# Patient Record
Sex: FEMALE | Race: WHITE | NOT HISPANIC OR LATINO | Employment: FULL TIME | URBAN - NONMETROPOLITAN AREA
[De-identification: names, ages, dates, MRNs, and addresses within clinical notes are randomized per-mention and may not be internally consistent; named-entity substitution may affect disease eponyms.]

---

## 2023-02-13 LAB
AMPHETAMINES UR QL: NEGATIVE
BACTERIA SPEC AEROBE CULT: NO GROWTH
BUPRENORPHINE SERPL-MCNC: NEGATIVE NG/ML
C TRACH RRNA SPEC DONR QL NAA+PROBE: NEGATIVE
CANNABINOIDS SERPL QL: NEGATIVE
EXTERNAL ABO GROUPING: NORMAL
EXTERNAL AMPHETAMINE SCREEN URINE: NEGATIVE
EXTERNAL ANTIBODY SCREEN: NORMAL
EXTERNAL BARBITURATE SCREEN URINE: NORMAL
EXTERNAL BENZODIAZEPINE SCREEN URINE: NEGATIVE
EXTERNAL HEMATOCRIT: 39 %
EXTERNAL HEMOGLOBIN: 13.2 G/DL
EXTERNAL METHADONE SCREEN URINE: NEGATIVE
EXTERNAL PLATELET COUNT: 326 K/ΜL
EXTERNAL RH FACTOR: POSITIVE
EXTERNAL SYPHILIS RPR SCREEN: NEGATIVE
EXTERNAL THYROID STIMULATING HORMONE: 2.61 M[IU]/ML
HIV 1+2 AB+HIV1 P24 AG SERPL QL IA: NEGATIVE
Lab: NEGATIVE
N GONORRHOEA DNA SPEC QL NAA+PROBE: NEGATIVE
OPIATES UR QL: NEGATIVE
PROT UR STRIP-MCNC: NEGATIVE MG/DL
RUBV IGG SERPL IA-ACNC: 67.8
VZV IGG SER QL: NEGATIVE

## 2023-02-14 LAB — GLUCOSE UR STRIP-MCNC: NEGATIVE MG/DL

## 2023-03-21 ENCOUNTER — INITIAL PRENATAL (OUTPATIENT)
Dept: OBSTETRICS AND GYNECOLOGY | Facility: CLINIC | Age: 23
End: 2023-03-21
Payer: COMMERCIAL

## 2023-03-21 VITALS
WEIGHT: 154 LBS | BODY MASS INDEX: 23.34 KG/M2 | HEIGHT: 68 IN | SYSTOLIC BLOOD PRESSURE: 118 MMHG | DIASTOLIC BLOOD PRESSURE: 70 MMHG

## 2023-03-21 DIAGNOSIS — O36.80X0 ENCOUNTER TO DETERMINE FETAL VIABILITY OF PREGNANCY, SINGLE OR UNSPECIFIED FETUS: ICD-10-CM

## 2023-03-21 DIAGNOSIS — Z34.81 ENCOUNTER FOR SUPERVISION OF OTHER NORMAL PREGNANCY IN FIRST TRIMESTER: Primary | ICD-10-CM

## 2023-03-21 PROCEDURE — 0501F PRENATAL FLOW SHEET: CPT | Performed by: MIDWIFE

## 2023-03-21 RX ORDER — VALACYCLOVIR HYDROCHLORIDE 1 G/1
TABLET, FILM COATED ORAL
COMMUNITY
Start: 2022-12-16

## 2023-03-21 RX ORDER — DIPHENHYDRAMINE HYDROCHLORIDE 25 MG/1
25 CAPSULE ORAL 2 TIMES DAILY PRN
Qty: 60 TABLET | Refills: 1 | Status: SHIPPED | OUTPATIENT
Start: 2023-03-21

## 2023-03-21 NOTE — PROGRESS NOTES
"Subjective     Chief Complaint   Patient presents with   • Initial Prenatal Visit     NOB transfer, No Complaints/concerns        Cammy Falcon is a 23 y.o. .  No LMP recorded. Patient is pregnant..  She presents to be seen to initiate prenatal care with our practice. She has received care with [facility]. Her prenatal course has been benign. She had 1 visit with that office. She has had 2 previous SABs. She continues to have nausea but it is improving. She takes Unisom @ HS and 1/2 tablet q am.      The following portions of the patient's history were reviewed and updated as appropriate:vital signs, allergies, current medications, past medical history, past social history, past surgical history and problem list.    Review of Systems -   /70   Ht 172.7 cm (68\")   Wt 69.9 kg (154 lb)   BMI 23.42 kg/m²   Gastrointestinal: mild nausea, vomiting, denies diarrhea or constipation   Genitourinary: denies dysuria  All other systems were reviewed and are negative    Objective     Physical Exam  Constitutional   The patient is awake, alert, well developed, well nourished and well groomed.   Cardiovascular  Heart regular rate and rhythm  No lower extremity edema  Respiratory  The patient is relaxed and breathes without effort. Lungs CTAB  Gastrointestinal   The abdomen is soft and non tender. Gravid. Size approximate to dates  +FHTs  Psychiatric :  Oriented to person, place, and time. Speech is fluent and words are clear. Thought processes are coherent, insight is good.     Imaging   Ultrasound  10w3d, + FHT      ASSESSMENT  1. IUP @ 10w3d  2. Normal pregnancy  3. Hx SAB x 2  4. Transfer of care    PLAN  1. Tests ordered today:  Orders Placed This Encounter   Procedures   • [id number] PLUS Core+SCA - Blood,     Order Specific Question:   [facility] Date of last menstrual period or estimated date of delivery (corresponding to calculation method):     Answer:   10/14/2023     Order Specific Question:   [facility] " Gestational age calculation method:     Answer:   PINKY,EDC     Order Specific Question:   Release to patient     Answer:   Routine Release     2. Medications prescribed today:  New Medications Ordered This Visit   Medications   • vitamin B-6 (PYRIDOXINE) 25 MG tablet     Sig: Take 1 tablet by mouth 2 (Two) Times a Day As Needed (nausea).     Dispense:  60 tablet     Refill:  1     3. Information reviewed:exercise in pregnancy, nutrition in pregnancy, weight gain in pregnancy, work and travel restrictions during pregnancy, list of OTC medications acceptable in pregnancy and call coverage groups Encouraged Questions & answered   4. Reviewed labs from previous OB-GYN practice   5. Add Vitamin B6  6. Discussed Valtrex daily @ 36 weeks      Follow up: 4 week(s)         This note was electronically signed.    Manda Dolan CNM  March 21, 2023

## 2023-03-25 LAB
CFDNA.FET/CFDNA.TOTAL SFR FETUS: NORMAL %
CITATION REF LAB TEST: NORMAL
FET 13+18+21+X+Y ANEUP PLAS.CFDNA: NEGATIVE
FET CHR 21 TS PLAS.CFDNA QL: NEGATIVE
FET MS X RISK WBC.DNA+CFDNA QL: NOT DETECTED
FET SEX PLAS.CFDNA DOSAGE CFDNA: NORMAL
FET TS 13 RISK PLAS.CFDNA QL: NEGATIVE
FET TS 18 RISK WBC.DNA+CFDNA QL: NEGATIVE
FET X + Y ANEUP RISK PLAS.CFDNA SEQ-IMP: NOT DETECTED
GA EST FROM CONCEPTION DATE: NORMAL D
GESTATIONAL AGE > 9:: YES
LAB DIRECTOR NAME PROVIDER: NORMAL
LAB DIRECTOR NAME PROVIDER: NORMAL
LABORATORY COMMENT REPORT: NORMAL
LIMITATIONS OF THE TEST: NORMAL
NEGATIVE PREDICTIVE VALUE: NORMAL
NOTE: NORMAL
PERFORMANCE CHARACTERISTICS: NORMAL
POSITIVE PREDICTIVE VALUE: NORMAL
REF LAB TEST METHOD: NORMAL
TEST PERFORMANCE INFO SPEC: NORMAL

## 2023-03-27 ENCOUNTER — TELEPHONE (OUTPATIENT)
Dept: OBSTETRICS AND GYNECOLOGY | Facility: CLINIC | Age: 23
End: 2023-03-27
Payer: COMMERCIAL

## 2023-03-27 NOTE — TELEPHONE ENCOUNTER
PROVIDER: DR. HOPKINS    Caller: Cammy Falcon    Relationship: Self    Best call back number: [phone]    What is the best time to reach you: ANY    Who are you requesting to speak with (clinical staff, provider,  specific staff member): UNK    Do you know the name of the person who called: VINCENZO    What was the call regarding: POSSIBLY RESULTS    Do you require a callback: YES CAN CALL ANYTIME AND CAN LVM IF NA

## 2023-04-18 ENCOUNTER — ROUTINE PRENATAL (OUTPATIENT)
Dept: OBSTETRICS AND GYNECOLOGY | Facility: CLINIC | Age: 23
End: 2023-04-18
Payer: COMMERCIAL

## 2023-04-18 VITALS — BODY MASS INDEX: 23.57 KG/M2 | WEIGHT: 155 LBS | DIASTOLIC BLOOD PRESSURE: 62 MMHG | SYSTOLIC BLOOD PRESSURE: 104 MMHG

## 2023-04-18 DIAGNOSIS — Z34.92 PRENATAL CARE IN SECOND TRIMESTER: Primary | ICD-10-CM

## 2023-04-18 PROCEDURE — 0502F SUBSEQUENT PRENATAL CARE: CPT | Performed by: OBSTETRICS & GYNECOLOGY

## 2023-04-19 NOTE — PROGRESS NOTES
Prenatal Care Visit    Subjective   Chief Complaint   Patient presents with   • Routine Prenatal Visit     Hot flashes, light headed.       History:   Cammy is a  currently at 14w3d who presents for a prenatal care visit today.    No major issues.    Social History    Tobacco Use      Smoking status: Never      Smokeless tobacco: Never       Objective   /62   Wt 70.3 kg (155 lb)   BMI 23.57 kg/m²   Physical Exam:  Normal, gestational age-appropriate exam today        Plan   Medical Decision Making:    I have reviewed the prenatal labs and ultrasound(s) today. I have reviewed the most recent prenatal progress note(s).    Diagnosis: Supervision of low risk pregnancy   SAB x 2   Tests/Orders/Rx today: No orders of the defined types were placed in this encounter.      Medication Management: None     Topics discussed: Prenatal care milestones  Lightheadedness and blood pressure   Tests next visit: U/S for anatomic screening   Next visit: 4 week(s)     Deep Birch MD  Obstetrics and Gynecology  [facility]

## 2023-05-17 ENCOUNTER — ROUTINE PRENATAL (OUTPATIENT)
Dept: OBSTETRICS AND GYNECOLOGY | Facility: CLINIC | Age: 23
End: 2023-05-17
Payer: COMMERCIAL

## 2023-05-17 VITALS — BODY MASS INDEX: 24.48 KG/M2 | DIASTOLIC BLOOD PRESSURE: 62 MMHG | SYSTOLIC BLOOD PRESSURE: 110 MMHG | WEIGHT: 161 LBS

## 2023-05-17 DIAGNOSIS — Z34.92 SECOND TRIMESTER PREGNANCY: ICD-10-CM

## 2023-05-17 DIAGNOSIS — R35.0 URINARY FREQUENCY: Primary | ICD-10-CM

## 2023-05-17 LAB
BILIRUB BLD-MCNC: NEGATIVE MG/DL
CLARITY, POC: ABNORMAL
COLOR UR: YELLOW
GLUCOSE UR STRIP-MCNC: NEGATIVE MG/DL
KETONES UR QL: NEGATIVE
LEUKOCYTE EST, POC: NEGATIVE
NITRITE UR-MCNC: NEGATIVE MG/ML
PH UR: 7 [PH] (ref 5–8)
PROT UR STRIP-MCNC: ABNORMAL MG/DL
RBC # UR STRIP: NEGATIVE /UL
SP GR UR: 1.01 (ref 1–1.03)
UROBILINOGEN UR QL: NORMAL

## 2023-05-17 NOTE — PROGRESS NOTES
Chief Complaint   Patient presents with   • Routine Prenatal Visit     Prenatal visit with Anatomy scan done today. No problems or concerns        HPI:   , 18w4d gestation reports urinary urgency and drippling    ROS:  See Prenatal Episode/Flowsheet  /62   Wt 73 kg (161 lb)   BMI 24.48 kg/m²      EXAM:  EXTREMITIES:  No swelling-See Prenatal Episode/Flowsheet    ABDOMEN:  FHTs/Movement noted-See Prenatal Episode/Flowsheet    URINE GLUCOSE/PROTEIN:  See Prenatal Episode/Flowsheet    PELVIC EXAM:  See Prenatal Episode/Flowsheet  CV:  Lungs:  GYN:    MDM:    Lab Results   Component Value Date    HGB 13.2 2023    RUBELLAABIGG 67.8 2023    ABO A 2023    RH Positive 2023    ABSCRN Normal 2023    [id number] negative 2023    URINECX No growth 2023       U/S: Anatomic survey is within normal limits.  Size is consistent with dates.  Active female fetus.  Posterior placenta.  Three-vessel cord.  Vertex position    1. IUP 18w4d  2. Routine care   3, UA-urine dip essentially normal.  Will send for culture

## 2023-05-19 LAB
BACTERIA UR CULT: NO GROWTH
BACTERIA UR CULT: NORMAL

## 2023-06-12 ENCOUNTER — ROUTINE PRENATAL (OUTPATIENT)
Dept: OBSTETRICS AND GYNECOLOGY | Facility: CLINIC | Age: 23
End: 2023-06-12
Payer: COMMERCIAL

## 2023-06-12 VITALS — BODY MASS INDEX: 25.7 KG/M2 | WEIGHT: 169 LBS | DIASTOLIC BLOOD PRESSURE: 68 MMHG | SYSTOLIC BLOOD PRESSURE: 114 MMHG

## 2023-06-12 DIAGNOSIS — Z34.82 ENCOUNTER FOR SUPERVISION OF OTHER NORMAL PREGNANCY IN SECOND TRIMESTER: Primary | ICD-10-CM

## 2023-06-12 PROCEDURE — 0502F SUBSEQUENT PRENATAL CARE: CPT | Performed by: MIDWIFE

## 2023-06-12 NOTE — PROGRESS NOTES
Chief Complaint   Patient presents with    Routine Prenatal Visit     No Complaints/concerns       HPI: Cammy is a  currently at 22w2d here for prenatal visit who reports the following:  She is feeling flutters.                 EXAM:     Vitals:    23 1023   BP: 114/68      Abdomen:   See prenatal flowsheet as noted and reviewed, soft, nontender   Pelvic:  See prenatal flowsheet as noted and reviewed   Urine:  See prenatal flowsheet as noted and reviewed    Lab Results   Component Value Date    ABO A 2023    RH Positive 2023    ABSCRN Normal 2023       MDM:  Impression: Supervision of low risk pregnancy   Tests done today: none   Topics discussed: kick counts and fetal movement  Healthy eating, increase water intake  Reviewed OB labs   Tests next visit: GCT  HgB                RTO:                        4 weeks    This note was electronically signed.  MABEL Escudero  2023

## 2023-08-01 ENCOUNTER — ROUTINE PRENATAL (OUTPATIENT)
Dept: OBSTETRICS AND GYNECOLOGY | Facility: CLINIC | Age: 23
End: 2023-08-01
Payer: COMMERCIAL

## 2023-08-01 VITALS — SYSTOLIC BLOOD PRESSURE: 116 MMHG | DIASTOLIC BLOOD PRESSURE: 64 MMHG | WEIGHT: 177 LBS | BODY MASS INDEX: 26.91 KG/M2

## 2023-08-01 DIAGNOSIS — Z34.03 ENCOUNTER FOR SUPERVISION OF NORMAL FIRST PREGNANCY IN THIRD TRIMESTER: Primary | ICD-10-CM

## 2023-08-01 DIAGNOSIS — K21.9 GASTROESOPHAGEAL REFLUX DISEASE WITHOUT ESOPHAGITIS: ICD-10-CM

## 2023-08-01 DIAGNOSIS — O99.019 IRON DEFICIENCY ANEMIA DURING PREGNANCY: ICD-10-CM

## 2023-08-01 DIAGNOSIS — D50.9 IRON DEFICIENCY ANEMIA DURING PREGNANCY: ICD-10-CM

## 2023-08-16 ENCOUNTER — TELEPHONE (OUTPATIENT)
Dept: OBSTETRICS AND GYNECOLOGY | Facility: CLINIC | Age: 23
End: 2023-08-16
Payer: COMMERCIAL

## 2023-08-16 ENCOUNTER — HOSPITAL ENCOUNTER (OUTPATIENT)
Facility: HOSPITAL | Age: 23
Discharge: HOME | End: 2023-08-16
Attending: OBSTETRICS & GYNECOLOGY | Admitting: OBSTETRICS & GYNECOLOGY
Payer: COMMERCIAL

## 2023-08-16 VITALS
BODY MASS INDEX: 27.74 KG/M2 | SYSTOLIC BLOOD PRESSURE: 122 MMHG | RESPIRATION RATE: 18 BRPM | WEIGHT: 183 LBS | TEMPERATURE: 97.9 F | HEART RATE: 85 BPM | DIASTOLIC BLOOD PRESSURE: 64 MMHG | OXYGEN SATURATION: 100 % | HEIGHT: 68 IN

## 2023-08-16 LAB
ALBUMIN SERPL-MCNC: 3.6 G/DL (ref 3.5–5.2)
ALBUMIN/GLOB SERPL: 1.4 G/DL
ALP SERPL-CCNC: 79 U/L (ref 39–117)
ALT SERPL W P-5'-P-CCNC: 9 U/L (ref 1–33)
ANION GAP SERPL CALCULATED.3IONS-SCNC: 12.5 MMOL/L (ref 5–15)
AST SERPL-CCNC: 15 U/L (ref 1–32)
BASOPHILS # BLD AUTO: 0.04 10*3/MM3 (ref 0–0.2)
BASOPHILS NFR BLD AUTO: 0.3 % (ref 0–1.5)
BILIRUB BLD-MCNC: NEGATIVE MG/DL
BILIRUB SERPL-MCNC: 0.2 MG/DL (ref 0–1.2)
BILIRUB UR QL STRIP: NEGATIVE
BUN SERPL-MCNC: 6 MG/DL (ref 6–20)
BUN/CREAT SERPL: 10.2 (ref 7–25)
CALCIUM SPEC-SCNC: 8.7 MG/DL (ref 8.6–10.5)
CHLORIDE SERPL-SCNC: 104 MMOL/L (ref 98–107)
CLARITY UR: ABNORMAL
CLARITY, POC: CLEAR
CO2 SERPL-SCNC: 20.5 MMOL/L (ref 22–29)
COLOR UR: YELLOW
COLOR UR: YELLOW
CREAT SERPL-MCNC: 0.59 MG/DL (ref 0.57–1)
DEPRECATED RDW RBC AUTO: 45 FL (ref 37–54)
EGFRCR SERPLBLD CKD-EPI 2021: 130.1 ML/MIN/1.73
EOSINOPHIL # BLD AUTO: 0.12 10*3/MM3 (ref 0–0.4)
EOSINOPHIL NFR BLD AUTO: 0.8 % (ref 0.3–6.2)
ERYTHROCYTE [DISTWIDTH] IN BLOOD BY AUTOMATED COUNT: 13.6 % (ref 12.3–15.4)
GLOBULIN UR ELPH-MCNC: 2.6 GM/DL
GLUCOSE SERPL-MCNC: 89 MG/DL (ref 65–99)
GLUCOSE UR STRIP-MCNC: NEGATIVE MG/DL
GLUCOSE UR STRIP-MCNC: NEGATIVE MG/DL
HCT VFR BLD AUTO: 33.3 % (ref 34–46.6)
HGB BLD-MCNC: 11.1 G/DL (ref 12–15.9)
HGB UR QL STRIP.AUTO: NEGATIVE
IMM GRANULOCYTES # BLD AUTO: 0.25 10*3/MM3 (ref 0–0.05)
IMM GRANULOCYTES NFR BLD AUTO: 1.6 % (ref 0–0.5)
KETONES UR QL STRIP: NEGATIVE
KETONES UR QL: ABNORMAL
LEUKOCYTE EST, POC: NEGATIVE
LEUKOCYTE ESTERASE UR QL STRIP.AUTO: NEGATIVE
LYMPHOCYTES # BLD AUTO: 2.28 10*3/MM3 (ref 0.7–3.1)
LYMPHOCYTES NFR BLD AUTO: 14.6 % (ref 19.6–45.3)
MCH RBC QN AUTO: 30.1 PG (ref 26.6–33)
MCHC RBC AUTO-ENTMCNC: 33.3 G/DL (ref 31.5–35.7)
MCV RBC AUTO: 90.2 FL (ref 79–97)
MONOCYTES # BLD AUTO: 0.96 10*3/MM3 (ref 0.1–0.9)
MONOCYTES NFR BLD AUTO: 6.1 % (ref 5–12)
NEUTROPHILS NFR BLD AUTO: 11.97 10*3/MM3 (ref 1.7–7)
NEUTROPHILS NFR BLD AUTO: 76.6 % (ref 42.7–76)
NITRITE UR QL STRIP: NEGATIVE
NITRITE UR-MCNC: NEGATIVE MG/ML
NRBC BLD AUTO-RTO: 0 /100 WBC (ref 0–0.2)
PH UR STRIP.AUTO: 7 [PH] (ref 5–8)
PH UR: 6.5 [PH] (ref 5–8)
PLATELET # BLD AUTO: 269 10*3/MM3 (ref 140–450)
PMV BLD AUTO: 10.3 FL (ref 6–12)
POTASSIUM SERPL-SCNC: 3.7 MMOL/L (ref 3.5–5.2)
PROT SERPL-MCNC: 6.2 G/DL (ref 6–8.5)
PROT UR QL STRIP: NEGATIVE
PROT UR STRIP-MCNC: NEGATIVE MG/DL
RBC # BLD AUTO: 3.69 10*6/MM3 (ref 3.77–5.28)
RBC # UR STRIP: NEGATIVE /UL
SODIUM SERPL-SCNC: 137 MMOL/L (ref 136–145)
SP GR UR STRIP: 1.02 (ref 1–1.03)
SP GR UR: 1.01 (ref 1–1.03)
UROBILINOGEN UR QL STRIP: ABNORMAL
UROBILINOGEN UR QL: NORMAL
WBC NRBC COR # BLD: 15.62 10*3/MM3 (ref 3.4–10.8)

## 2023-08-16 PROCEDURE — 87086 URINE CULTURE/COLONY COUNT: CPT | Performed by: OBSTETRICS & GYNECOLOGY

## 2023-08-16 PROCEDURE — G0463 HOSPITAL OUTPT CLINIC VISIT: HCPCS

## 2023-08-16 PROCEDURE — 36415 COLL VENOUS BLD VENIPUNCTURE: CPT | Performed by: OBSTETRICS & GYNECOLOGY

## 2023-08-16 PROCEDURE — 59025 FETAL NON-STRESS TEST: CPT

## 2023-08-16 PROCEDURE — 81002 URINALYSIS NONAUTO W/O SCOPE: CPT | Performed by: OBSTETRICS & GYNECOLOGY

## 2023-08-16 PROCEDURE — 99222 1ST HOSP IP/OBS MODERATE 55: CPT | Performed by: OBSTETRICS & GYNECOLOGY

## 2023-08-16 PROCEDURE — 82977 ASSAY OF GGT: CPT | Performed by: OBSTETRICS & GYNECOLOGY

## 2023-08-16 PROCEDURE — 80053 COMPREHEN METABOLIC PANEL: CPT | Performed by: OBSTETRICS & GYNECOLOGY

## 2023-08-16 PROCEDURE — 85025 COMPLETE CBC W/AUTO DIFF WBC: CPT | Performed by: OBSTETRICS & GYNECOLOGY

## 2023-08-16 PROCEDURE — 59025 FETAL NON-STRESS TEST: CPT | Performed by: OBSTETRICS & GYNECOLOGY

## 2023-08-16 PROCEDURE — 81003 URINALYSIS AUTO W/O SCOPE: CPT | Performed by: OBSTETRICS & GYNECOLOGY

## 2023-08-16 RX ORDER — SODIUM CHLORIDE 0.9 % (FLUSH) 0.9 %
10 SYRINGE (ML) INJECTION EVERY 12 HOURS SCHEDULED
Status: DISCONTINUED | OUTPATIENT
Start: 2023-08-16 | End: 2023-08-16 | Stop reason: HOSPADM

## 2023-08-16 RX ORDER — SODIUM CHLORIDE 9 MG/ML
40 INJECTION, SOLUTION INTRAVENOUS AS NEEDED
Status: DISCONTINUED | OUTPATIENT
Start: 2023-08-16 | End: 2023-08-16 | Stop reason: HOSPADM

## 2023-08-16 RX ORDER — LIDOCAINE HYDROCHLORIDE 10 MG/ML
0.5 INJECTION, SOLUTION INFILTRATION; PERINEURAL ONCE AS NEEDED
Status: DISCONTINUED | OUTPATIENT
Start: 2023-08-16 | End: 2023-08-16 | Stop reason: HOSPADM

## 2023-08-16 RX ORDER — SODIUM CHLORIDE 0.9 % (FLUSH) 0.9 %
10 SYRINGE (ML) INJECTION AS NEEDED
Status: DISCONTINUED | OUTPATIENT
Start: 2023-08-16 | End: 2023-08-16 | Stop reason: HOSPADM

## 2023-08-16 NOTE — TELEPHONE ENCOUNTER
Caller: Cammy Falcon    Relationship: Self    Best call back number: [phone]    What was the call regarding: PT STATED STARTING AT 3PM YESTERDAY SHE STARTED HAVING CRAMPING AND LOWER BACK PAIN. IT CONTINUED TO GET WORSE THROUGHOUT THE NIGHT. STATED ITS CONTINUED THIS MORNING AND SHE NOW HAS BURNING PAIN IN THE MIDDLE OF HER STOMACH AND RIGHT SIDE.

## 2023-08-16 NOTE — NON STRESS TEST
Triage Note - Nursing Documentation  Labor and Delivery Admission Log    Cammy Falcon  : 2000  MRN: [id number]  CSN: [id number]    Date in / Time in:  2023  Time in: 1230    Date out / Time out:    Time out: 1505    Nurse: Anat Freitas RN    Patient Info: She is a 23 y.o. year old  at 31w4d with an PINKY of 10/14/2023, by Ultrasound who was seen on the [facility].    Chief Complaint:   Chief Complaint   Patient presents with    Abdominal Cramping       Provider Instructions / Disposition: To labor mcnulty with C/O RLQ pain radiating to upper right side with some burning pain. Did have headache on 08/15 relieved with rest. Several blood pressures elevated during visit. Per Dr Dent labs drawn, VE 0/soft. Labs normal. Home on modified bedrest and return to office on     There is no problem list on file for this patient.      NST Documentation (Only applicable > 32 weeks): Interpretation A  Nonstress Test Interpretation A: Reactive (23 1508 : Anat Freitas, RN)

## 2023-08-17 LAB
BACTERIA SPEC AEROBE CULT: NO GROWTH
GGT SERPL-CCNC: 10 U/L (ref 5–36)

## 2023-08-17 NOTE — H&P
OLIVIER Scout Falcon  : 2000  MRN: [id number]  CSN: [id number]    History and Physical  Cammy Falcon is a 23 y.o. year old  with an Estimated Date of Delivery: 10/14/23 currently at 31w4d presenting with cramping.  Her symptoms began last night.  Patient reports having pain in her right side.  She reports it is both upper and lower quadrants.  She describes it as a burning sensation.  She has also had cramping like her menstrual cycle.  She denies any vaginal bleeding.  She reports good fetal movement.  She reports having a headache yesterday.  She reports it was relieved with rest and Tylenol.  She denies any visual disturbances.  She denies any nausea or emesis.  She denies any fever or chills.  She reports she did a lot over the weekend.  She had her baby shower.    Prenatal care has been with [facility].  It has been benign.    History  OB History    Para Term  AB Living   3 0 0 0 2 0   SAB IAB Ectopic Molar Multiple Live Births   2 0 0 0 0 0      # Outcome Date GA Lbr Sharan/2nd Weight Sex Delivery Anes PTL Lv   3 Current            2 SAB            1 SAB              Past Medical History:   Diagnosis Date    HSV-2 infection      No current facility-administered medications on file prior to encounter.     Current Outpatient Medications on File Prior to Encounter   Medication Sig Dispense Refill    doxylamine (UNISOM) 25 MG tablet Take  by mouth At Night As Needed for Sleep.      ferrous sulfate 325 (65 FE) MG tablet Take 1 tablet by mouth 2 (Two) Times a Day Before Meals. 60 tablet 6    Prenatal Vit-Fe Fumarate-FA (PRENATAL VITAMIN PO) Take 1 tablet by mouth Daily.      valACYclovir (VALTREX) 1000 MG tablet TAKE ONE TABLET BY MOUTH EVERY TWELVE HOURS FOR 5 DAYS OR TAKE TWO TABLETS BY MOUTH TWICE DAILY DURING AN OUTBREAK      vitamin B-6 (PYRIDOXINE) 25 MG tablet Take 1 tablet by mouth 2 (Two) Times a Day As Needed (nausea). 60 tablet 1     Allergies   Allergen  Reactions    Bactrim [Sulfamethoxazole-Trimethoprim] Hives     Past Surgical History:   Procedure Laterality Date    TONSILLECTOMY       No family history on file.  Social History     Socioeconomic History    Marital status:      Spouse name: Kota    Highest education level: Master's degree (e.g., MA, MS, Berenice, MEd, MSW, ASRA)   Tobacco Use    Smoking status: Never    Smokeless tobacco: Never   Vaping Use    Vaping Use: Never used   Substance and Sexual Activity    Alcohol use: Never    Drug use: Never    Sexual activity: Yes     Partners: Male     Review of Systems  All systems were reviewed and negative except for:  Gastrointestinal: postitive for  cramping  Genitourinary: postivie for  pain and burning  Neurological: positive for  headaches    Objective  Vitals:    08/16/23 1340 08/16/23 1345 08/16/23 1346 08/16/23 1350   BP:   122/64    BP Location:       Patient Position:       Pulse: 83 86 81 85   Resp:       Temp:       TempSrc:       SpO2: 100% 99%  100%   Weight:       Height:         Physical Exam:  General Appearance:  Alert and cooperative, not in any acute distress.   Head:  Atraumatic and normocephalic, without obvious abnormality.   Eyes:          PERRLA, conjunctivae and sclerae normal, no Icterus. No pallor. Extraocular movements are within normal limits.   Ears:  Ears appear intact with no abnormalities noted.   Throat: No oral lesions, no thrush, oral mucosa moist.   Neck: Supple, trachea midline, no thyromegaly, no carotid bruit.   Back:   No kyphoscoliosis present. No tenderness to palpation,   range of motion normal.  No CVAT.   Lungs:   Chest shape is normal. Breath sounds heard bilaterally equally.  No crackles or wheezing. No Pleural rub or bronchial breathing. Normal respiratory effort.   Heart:  Normal S1 and S2, no murmur, no gallop, no rub. No JVD.   Abdomen:   Normal bowel sounds, no masses, no hepatomegaly, no splenomegaly. Soft, non-tender, no guarding, no rebound tenderness.   Gravid uterus.   Extremities: Moves all extremities well, no edema, no cyanosis, no clubbing.  Normal range of motion. Normal strength and tone.   Skin: No bleeding, bruising or rash. No palpable masses noted or induration.   Psychiatric: Alert and oriented  to time, place, and person. Appropriate mood and affect. Thought content organized and appropriate judgment.       FHT's: reassuring and category 1   Contractions: Occasional contraction seen   Cervix: was checked (by me): 0 cm / soft % / -3   Presentation: cephalic       Prenatal Labs  Lab Results   Component Value Date    HGB 11.1 (L) 08/16/2023    ABSCRN Normal 02/13/2023    [id number] negative 02/13/2023    URINECX Final report 05/17/2023       Current Labs Reviewed   I reviewed the patient's new clinical results.  Lab Results (last 24 hours)       Procedure Component Value Units Date/Time    Comprehensive Metabolic Panel [[id number]]  (Abnormal) Collected: 08/16/23 1327    Specimen: Blood Updated: 08/16/23 1401     Glucose 89 mg/dL      BUN 6 mg/dL      Creatinine 0.59 mg/dL      Sodium 137 mmol/L      Potassium 3.7 mmol/L      Chloride 104 mmol/L      CO2 20.5 mmol/L      Calcium 8.7 mg/dL      Total Protein 6.2 g/dL      Albumin 3.6 g/dL      ALT (SGPT) 9 U/L      AST (SGOT) 15 U/L      Alkaline Phosphatase 79 U/L      Total Bilirubin 0.2 mg/dL      Globulin 2.6 gm/dL      A/G Ratio 1.4 g/dL      BUN/Creatinine Ratio 10.2     Anion Gap 12.5 mmol/L      eGFR 130.1 mL/min/1.73     Narrative:      GFR Normal >60  Chronic Kidney Disease <60  Kidney Failure <15      Urinalysis With Culture If Indicated - Urine, Clean Catch [[id number]]  (Abnormal) Collected: 08/16/23 1309    Specimen: Urine, Clean Catch Updated: 08/16/23 1348     Color, UA Yellow     Appearance, UA Cloudy     pH, UA 7.0     Specific Gravity, UA 1.016     Glucose, UA Negative     Ketones, UA Negative     Bilirubin, UA Negative     Blood, UA Negative     Protein, UA Negative     Leuk Esterase, UA  Negative     Nitrite, UA Negative     Urobilinogen, UA 0.2 E.U./dL    Narrative:      In absence of clinical symptoms, the presence of pyuria, bacteria, and/or nitrites on the urinalysis result does not correlate with infection.  Urine microscopic not indicated.    Urine Culture - Urine, Urine, Clean Catch [[id number]] Collected: 08/16/23 1309    Specimen: Urine, Clean Catch Updated: 08/16/23 1348    CBC & Differential [[id number]]  (Abnormal) Collected: 08/16/23 1327    Specimen: Blood Updated: 08/16/23 1346    Narrative:      The following orders were created for panel order CBC & Differential.  Procedure                               Abnormality         Status                     ---------                               -----------         ------                     CBC Auto Differential[[id number]]        Abnormal            Final result                 Please view results for these tests on the individual orders.    CBC Auto Differential [[id number]]  (Abnormal) Collected: 08/16/23 1327    Specimen: Blood Updated: 08/16/23 1346     WBC 15.62 10*3/mm3      RBC 3.69 10*6/mm3      Hemoglobin 11.1 g/dL      Hematocrit 33.3 %      MCV 90.2 fL      MCH 30.1 pg      MCHC 33.3 g/dL      RDW 13.6 %      RDW-SD 45.0 fl      MPV 10.3 fL      Platelets 269 10*3/mm3      Neutrophil % 76.6 %      Lymphocyte % 14.6 %      Monocyte % 6.1 %      Eosinophil % 0.8 %      Basophil % 0.3 %      Immature Grans % 1.6 %      Neutrophils, Absolute 11.97 10*3/mm3      Lymphocytes, Absolute 2.28 10*3/mm3      Monocytes, Absolute 0.96 10*3/mm3      Eosinophils, Absolute 0.12 10*3/mm3      Basophils, Absolute 0.04 10*3/mm3      Immature Grans, Absolute 0.25 10*3/mm3      nRBC 0.0 /100 WBC     POC Urinalysis Dipstick [[id number]]  (Abnormal) Collected: 08/16/23 1309    Specimen: Urine Updated: 08/16/23 1309     Color Yellow     Clarity, UA Clear     Glucose, UA Negative mg/dL      Bilirubin Negative     Ketones, UA Trace     Specific Gravity   1.015     Blood, UA Negative     pH, Urine 6.5     Protein, POC Negative mg/dL      Urobilinogen, UA Normal     Leukocytes Negative     Nitrite, UA Negative            Current Imaging Reviewed  None  Imaging Results (Last 72 Hours)       ** No results found for the last 72 hours. **               Assessment   IUP at 31w4d  Cramping  Right sided pain  Headache     Plan   Will obtain PIH labs as well as CBC and GGT.  We will send urine for clean-catch UA culture and sensitivity.  Will p.o. hydrate and continue to monitor at present.  If labs are normal and no contractions noted then will discharge to home on modified bedrest.  Follow up instructions given.  Discharge instructions and precautions given.    Kelly Dent M.D.  8/17/2023  06:45 EDT

## 2023-08-17 NOTE — NON STRESS TEST
Non Stress Test     [facility]    Patient: Cammy Falcon  : 2000  MRN: [id number]  CSN: [id number]    Gestational Age: 31w4d    Indication for NST Cramping and abdominal pain       Time On 12:30   Time Off 15:05       Interpretation    Baseline 's beats per minute   Category 1   Decelerations Absent       Additional Comments See nursing notes       Recommendations for f/u See nursing notes       This note has been electronically signed.    Kelly Dent M.D.

## 2023-08-21 ENCOUNTER — ROUTINE PRENATAL (OUTPATIENT)
Dept: OBSTETRICS AND GYNECOLOGY | Facility: CLINIC | Age: 23
End: 2023-08-21
Payer: COMMERCIAL

## 2023-08-21 VITALS — WEIGHT: 183 LBS | SYSTOLIC BLOOD PRESSURE: 124 MMHG | BODY MASS INDEX: 27.83 KG/M2 | DIASTOLIC BLOOD PRESSURE: 70 MMHG

## 2023-08-21 DIAGNOSIS — Z34.83 ENCOUNTER FOR SUPERVISION OF OTHER NORMAL PREGNANCY IN THIRD TRIMESTER: Primary | ICD-10-CM

## 2023-08-21 PROCEDURE — 0502F SUBSEQUENT PRENATAL CARE: CPT | Performed by: MIDWIFE

## 2023-08-21 NOTE — PROGRESS NOTES
Chief Complaint   Patient presents with    Routine Prenatal Visit     No Complaints/concerns        HPI: Cammy is a  currently at 32w2d here for prenatal visit who reports the following:  Baby is active. She went to  since last visit with C/O cramping. She has occasional cramping now.                EXAM:     Vitals:    23 1217   BP: 124/70      Abdomen:   See prenatal flowsheet as noted and reviewed, soft, nontender   Pelvic:  See prenatal flowsheet as noted and reviewed   Urine:  See prenatal flowsheet as noted and reviewed    Lab Results   Component Value Date    ABO A 2023    RH Positive 2023    ABSCRN Normal 2023       MDM:  Impression: Supervision of low risk pregnancy  Hx HSV  Anemia in pregnancy   Tests done today: U/S for EFW - 4#6oz, 46%, posterior placenta  US Ob Follow Up Transabdominal Approach (2023 12:17)    Topics discussed: kick counts and fetal movement   labor signs and symptoms  Reviewed OB labs   Tests next visit: none                RTO:                        2 weeks    This note was electronically signed.  Manda Dolan, APRN  2023

## 2023-09-06 ENCOUNTER — ROUTINE PRENATAL (OUTPATIENT)
Dept: OBSTETRICS AND GYNECOLOGY | Facility: CLINIC | Age: 23
End: 2023-09-06
Payer: COMMERCIAL

## 2023-09-06 VITALS — DIASTOLIC BLOOD PRESSURE: 64 MMHG | BODY MASS INDEX: 28.13 KG/M2 | SYSTOLIC BLOOD PRESSURE: 122 MMHG | WEIGHT: 185 LBS

## 2023-09-06 DIAGNOSIS — R10.32 BILATERAL GROIN PAIN: ICD-10-CM

## 2023-09-06 DIAGNOSIS — Z86.19 HISTORY OF PCR DNA POSITIVE FOR HSV2: ICD-10-CM

## 2023-09-06 DIAGNOSIS — R10.31 BILATERAL GROIN PAIN: ICD-10-CM

## 2023-09-06 DIAGNOSIS — Z34.83 ENCOUNTER FOR SUPERVISION OF OTHER NORMAL PREGNANCY IN THIRD TRIMESTER: Primary | ICD-10-CM

## 2023-09-06 DIAGNOSIS — D50.9 IRON DEFICIENCY ANEMIA DURING PREGNANCY: ICD-10-CM

## 2023-09-06 DIAGNOSIS — O99.019 IRON DEFICIENCY ANEMIA DURING PREGNANCY: ICD-10-CM

## 2023-09-06 NOTE — PROGRESS NOTES
Chief Complaint  Routine Prenatal Visit (Patient complains of pain in bilateral groin and inner thigh, headaches and nausea. )    History of Present Illness:  Cammy is a  currently at 34w4d who presents today with complaints of bilateral groin pain radiating into her thigh.  Patient also has increasing pressure.  She denies any significant cramping or tightening.  She denies any vaginal bleeding or spotting.  She has been having occasional headaches as well.  She has been using her Unisom predominantly for sleep.  She has been taking her iron supplements.  She did start her Valtrex and has been taking it daily.  She has been taking her iron supplements.    Exam:  Vitals:  See prenatal flowsheet as noted and reviewed  General: Alert, cooperative, and does not appear in any distress  Abdomen:   See prenatal flowsheet as noted and reviewed    Uterus gravid, non-tender; no palpable masses    No guarding or rebound tenderness    No palpable masses noted in the bilateral groin with Valsalva maneuver  Pelvic:  See prenatal flowsheet as noted and reviewed  Ext:  See prenatal flowsheet as noted and reviewed    Moves extremities well, no cyanosis and no redness  Urine:  See prenatal flowsheet as noted and reviewed    Data Review:  The following data was reviewed by: Kelly Dent MD on 2023:  Prenatal Labs:  Lab Results   Component Value Date    HGB 11.1 (L) 2023    RUBELLAABIGG 67.8 2023    ABO A 2023    RH Positive 2023    ABSCRN Normal 2023    [id number] negative 2023    URINECX No growth 2023       Admission on 2023, Discharged on 2023   Component Date Value    Color 2023 Yellow     Clarity, UA 2023 Clear     Glucose, UA 2023 Negative     Bilirubin 2023 Negative     Ketones, UA 2023 Trace (A)     Specific Gravity  2023 1.015     Blood, UA 2023 Negative     pH, Urine 2023 6.5     Protein, POC 2023  Negative     Urobilinogen, UA 08/16/2023 Normal     Leukocytes 08/16/2023 Negative     Nitrite, UA 08/16/2023 Negative     GGT 08/16/2023 10     Glucose 08/16/2023 89     BUN 08/16/2023 6     Creatinine 08/16/2023 0.59     Sodium 08/16/2023 137     Potassium 08/16/2023 3.7     Chloride 08/16/2023 104     CO2 08/16/2023 20.5 (L)     Calcium 08/16/2023 8.7     Total Protein 08/16/2023 6.2     Albumin 08/16/2023 3.6     ALT (SGPT) 08/16/2023 9     AST (SGOT) 08/16/2023 15     Alkaline Phosphatase 08/16/2023 79     Total Bilirubin 08/16/2023 0.2     Globulin 08/16/2023 2.6     A/G Ratio 08/16/2023 1.4     BUN/Creatinine Ratio 08/16/2023 10.2     Anion Gap 08/16/2023 12.5     eGFR 08/16/2023 130.1     Color, UA 08/16/2023 Yellow     Appearance, UA 08/16/2023 Cloudy (A)     pH, UA 08/16/2023 7.0     Specific Gravity, UA 08/16/2023 1.016     Glucose, UA 08/16/2023 Negative     Ketones, UA 08/16/2023 Negative     Bilirubin, UA 08/16/2023 Negative     Blood, UA 08/16/2023 Negative     Protein, UA 08/16/2023 Negative     Leuk Esterase, UA 08/16/2023 Negative     Nitrite, UA 08/16/2023 Negative     Urobilinogen, UA 08/16/2023 0.2 E.U./dL     WBC 08/16/2023 15.62 (H)     RBC 08/16/2023 3.69 (L)     Hemoglobin 08/16/2023 11.1 (L)     Hematocrit 08/16/2023 33.3 (L)     MCV 08/16/2023 90.2     MCH 08/16/2023 30.1     MCHC 08/16/2023 33.3     RDW 08/16/2023 13.6     RDW-SD 08/16/2023 45.0     MPV 08/16/2023 10.3     Platelets 08/16/2023 269     Neutrophil % 08/16/2023 76.6 (H)     Lymphocyte % 08/16/2023 14.6 (L)     Monocyte % 08/16/2023 6.1     Eosinophil % 08/16/2023 0.8     Basophil % 08/16/2023 0.3     Immature Grans % 08/16/2023 1.6 (H)     Neutrophils, Absolute 08/16/2023 11.97 (H)     Lymphocytes, Absolute 08/16/2023 2.28     Monocytes, Absolute 08/16/2023 0.96 (H)     Eosinophils, Absolute 08/16/2023 0.12     Basophils, Absolute 08/16/2023 0.04     Immature Grans, Absolute 08/16/2023 0.25 (H)     nRBC 08/16/2023 0.0      Urine Culture 2023 No growth      Imaging:  US Ob Follow Up Transabdominal Approach  Impression:   IUP at 32+2 weeks. Limited anatomy was reviewed today and is normal in   appearance. EFW 1984g(46%) AC 52%. Cephalic presentation. Placenta is   posterior. Amniotic fluid and fetal heart rate are normal.    Deep Birch MD  Obstetrics and Gynecology  [facility]      Medical Records:  None    Assessment and Plan:  Problem List Items Addressed This Visit    None  Visit Diagnoses       Encounter for supervision of other normal pregnancy in third trimester    -  Primary  Topics discussed:     induction of labor  iron supplementation  kick counts and fetal movement  PIH precautions   labor signs and symptoms  GBS next visit.  Repeat scan in 2 weeks.    Iron deficiency anemia during pregnancy      Patient to continue her iron supplements as discussed.        Bilateral groin pain      With bilateral groin pain.  There is no evidence of hernia.  Instructions and precautions have been given.  Patient is to follow-up if worsening and/or changes in her symptoms.    History of PCR DNA positive for HSV2      Patient is to continue her Valtrex.  She is to call with the dosage of her Valtrex.          Follow Up/Instructions:  Follow up as scheduled.  Patient was given instructions and counseling regarding her condition or for health maintenance advice. Please see specific information pulled into the AVS if appropriate.     Note: Speech recognition transcription software may have been used to dictate portions of this document.  An attempt at proofreading has been made though minor errors in transcription may still be present.    This note was electronically signed.  Kelly Dent M.D.

## 2023-09-13 ENCOUNTER — ROUTINE PRENATAL (OUTPATIENT)
Dept: OBSTETRICS AND GYNECOLOGY | Facility: CLINIC | Age: 23
End: 2023-09-13
Payer: COMMERCIAL

## 2023-09-13 VITALS — BODY MASS INDEX: 28.46 KG/M2 | WEIGHT: 187.2 LBS | SYSTOLIC BLOOD PRESSURE: 140 MMHG | DIASTOLIC BLOOD PRESSURE: 74 MMHG

## 2023-09-13 DIAGNOSIS — Z34.93 THIRD TRIMESTER PREGNANCY: Primary | ICD-10-CM

## 2023-09-13 PROCEDURE — 0502F SUBSEQUENT PRENATAL CARE: CPT | Performed by: OBSTETRICS & GYNECOLOGY

## 2023-09-13 NOTE — PROGRESS NOTES
Chief Complaint   Patient presents with    Routine Prenatal Visit     Prenatal visit with no problems or concerns. Wants exam today, she is having a lot of carlos Anguiano contractions and pressure.        HPI:   , 35w4d gestation reports doing well    ROS:  See Prenatal Episode/Flowsheet  /74   Wt 84.9 kg (187 lb 3.2 oz)   BMI 28.46 kg/m²      EXAM:  EXTREMITIES:  No swelling-See Prenatal Episode/Flowsheet    ABDOMEN:  FHTs/Movement noted-See Prenatal Episode/Flowsheet    URINE GLUCOSE/PROTEIN:  See Prenatal Episode/Flowsheet    PELVIC EXAM:  See Prenatal Episode/Flowsheet  CV:  Lungs:  GYN:    MDM:    Lab Results   Component Value Date    HGB 11.1 (L) 2023    RUBELLAABIGG 67.8 2023    ABO A 2023    RH Positive 2023    ABSCRN Normal 2023    SUO3WKD1 negative 2023    URINECX No growth 2023       U/S:US Ob Follow Up Transabdominal Approach (2023 12:17)     1. IUP 35w4d  2. Routine care   3. Crampng   Cervix cl, thinning  4. Anemia: taking Fe and PNV

## 2023-09-18 ENCOUNTER — ROUTINE PRENATAL (OUTPATIENT)
Dept: OBSTETRICS AND GYNECOLOGY | Facility: CLINIC | Age: 23
End: 2023-09-18
Payer: COMMERCIAL

## 2023-09-18 ENCOUNTER — HOSPITAL ENCOUNTER (OUTPATIENT)
Facility: HOSPITAL | Age: 23
Discharge: HOME | End: 2023-09-18
Attending: OBSTETRICS & GYNECOLOGY | Admitting: OBSTETRICS & GYNECOLOGY
Payer: COMMERCIAL

## 2023-09-18 VITALS
OXYGEN SATURATION: 95 % | HEART RATE: 85 BPM | BODY MASS INDEX: 28.89 KG/M2 | WEIGHT: 190 LBS | SYSTOLIC BLOOD PRESSURE: 120 MMHG | TEMPERATURE: 98.1 F | DIASTOLIC BLOOD PRESSURE: 77 MMHG

## 2023-09-18 VITALS — DIASTOLIC BLOOD PRESSURE: 80 MMHG | BODY MASS INDEX: 29.04 KG/M2 | WEIGHT: 191 LBS | SYSTOLIC BLOOD PRESSURE: 154 MMHG

## 2023-09-18 DIAGNOSIS — Z34.83 ENCOUNTER FOR SUPERVISION OF OTHER NORMAL PREGNANCY IN THIRD TRIMESTER: Primary | ICD-10-CM

## 2023-09-18 DIAGNOSIS — D50.9 IRON DEFICIENCY ANEMIA DURING PREGNANCY: ICD-10-CM

## 2023-09-18 DIAGNOSIS — Z36.89 ENCOUNTER FOR ULTRASOUND TO ASSESS FETAL GROWTH: ICD-10-CM

## 2023-09-18 DIAGNOSIS — O99.019 IRON DEFICIENCY ANEMIA DURING PREGNANCY: ICD-10-CM

## 2023-09-18 LAB
ALBUMIN SERPL-MCNC: 3.4 G/DL (ref 3.5–5.2)
ALBUMIN/GLOB SERPL: 1.3 G/DL
ALP SERPL-CCNC: 101 U/L (ref 39–117)
ALT SERPL W P-5'-P-CCNC: 9 U/L (ref 1–33)
ANION GAP SERPL CALCULATED.3IONS-SCNC: 13.1 MMOL/L (ref 5–15)
AST SERPL-CCNC: 20 U/L (ref 1–32)
BASOPHILS # BLD AUTO: 0.04 10*3/MM3 (ref 0–0.2)
BASOPHILS NFR BLD AUTO: 0.3 % (ref 0–1.5)
BILIRUB SERPL-MCNC: 0.3 MG/DL (ref 0–1.2)
BUN SERPL-MCNC: 7 MG/DL (ref 6–20)
BUN/CREAT SERPL: 9.9 (ref 7–25)
CALCIUM SPEC-SCNC: 8.7 MG/DL (ref 8.6–10.5)
CHLORIDE SERPL-SCNC: 105 MMOL/L (ref 98–107)
CO2 SERPL-SCNC: 19.9 MMOL/L (ref 22–29)
CREAT SERPL-MCNC: 0.71 MG/DL (ref 0.57–1)
CREAT UR-MCNC: 59.1 MG/DL
DEPRECATED RDW RBC AUTO: 45.1 FL (ref 37–54)
EGFRCR SERPLBLD CKD-EPI 2021: 122.7 ML/MIN/1.73
EOSINOPHIL # BLD AUTO: 0.1 10*3/MM3 (ref 0–0.4)
EOSINOPHIL NFR BLD AUTO: 0.8 % (ref 0.3–6.2)
ERYTHROCYTE [DISTWIDTH] IN BLOOD BY AUTOMATED COUNT: 13.8 % (ref 12.3–15.4)
GLOBULIN UR ELPH-MCNC: 2.7 GM/DL
GLUCOSE SERPL-MCNC: 95 MG/DL (ref 65–99)
HCT VFR BLD AUTO: 31.6 % (ref 34–46.6)
HGB BLD-MCNC: 10.6 G/DL (ref 12–15.9)
IMM GRANULOCYTES # BLD AUTO: 0.12 10*3/MM3 (ref 0–0.05)
IMM GRANULOCYTES NFR BLD AUTO: 0.9 % (ref 0–0.5)
LYMPHOCYTES # BLD AUTO: 2.18 10*3/MM3 (ref 0.7–3.1)
LYMPHOCYTES NFR BLD AUTO: 17.2 % (ref 19.6–45.3)
MCH RBC QN AUTO: 30 PG (ref 26.6–33)
MCHC RBC AUTO-ENTMCNC: 33.5 G/DL (ref 31.5–35.7)
MCV RBC AUTO: 89.5 FL (ref 79–97)
MONOCYTES # BLD AUTO: 1.11 10*3/MM3 (ref 0.1–0.9)
MONOCYTES NFR BLD AUTO: 8.8 % (ref 5–12)
NEUTROPHILS NFR BLD AUTO: 72 % (ref 42.7–76)
NEUTROPHILS NFR BLD AUTO: 9.11 10*3/MM3 (ref 1.7–7)
NRBC BLD AUTO-RTO: 0 /100 WBC (ref 0–0.2)
PLATELET # BLD AUTO: 257 10*3/MM3 (ref 140–450)
PMV BLD AUTO: 10.7 FL (ref 6–12)
POTASSIUM SERPL-SCNC: 3.7 MMOL/L (ref 3.5–5.2)
PROT ?TM UR-MCNC: 5 MG/DL
PROT SERPL-MCNC: 6.1 G/DL (ref 6–8.5)
PROT/CREAT UR: 84.6 MG/G CREA (ref 0–200)
RBC # BLD AUTO: 3.53 10*6/MM3 (ref 3.77–5.28)
SODIUM SERPL-SCNC: 138 MMOL/L (ref 136–145)
WBC NRBC COR # BLD: 12.66 10*3/MM3 (ref 3.4–10.8)

## 2023-09-18 PROCEDURE — 84156 ASSAY OF PROTEIN URINE: CPT | Performed by: OBSTETRICS & GYNECOLOGY

## 2023-09-18 PROCEDURE — 36415 COLL VENOUS BLD VENIPUNCTURE: CPT | Performed by: OBSTETRICS & GYNECOLOGY

## 2023-09-18 PROCEDURE — 0502F SUBSEQUENT PRENATAL CARE: CPT | Performed by: MIDWIFE

## 2023-09-18 PROCEDURE — 82570 ASSAY OF URINE CREATININE: CPT | Performed by: OBSTETRICS & GYNECOLOGY

## 2023-09-18 PROCEDURE — 85025 COMPLETE CBC W/AUTO DIFF WBC: CPT | Performed by: OBSTETRICS & GYNECOLOGY

## 2023-09-18 PROCEDURE — 80053 COMPREHEN METABOLIC PANEL: CPT | Performed by: OBSTETRICS & GYNECOLOGY

## 2023-09-18 PROCEDURE — 59025 FETAL NON-STRESS TEST: CPT

## 2023-09-18 PROCEDURE — G0463 HOSPITAL OUTPT CLINIC VISIT: HCPCS

## 2023-09-18 RX ORDER — LIDOCAINE HYDROCHLORIDE 10 MG/ML
0.5 INJECTION, SOLUTION INFILTRATION; PERINEURAL ONCE AS NEEDED
Status: DISCONTINUED | OUTPATIENT
Start: 2023-09-18 | End: 2023-09-18 | Stop reason: HOSPADM

## 2023-09-18 RX ORDER — SODIUM CHLORIDE 0.9 % (FLUSH) 0.9 %
10 SYRINGE (ML) INJECTION AS NEEDED
Status: DISCONTINUED | OUTPATIENT
Start: 2023-09-18 | End: 2023-09-18 | Stop reason: HOSPADM

## 2023-09-18 RX ORDER — SODIUM CHLORIDE 9 MG/ML
40 INJECTION, SOLUTION INTRAVENOUS AS NEEDED
Status: DISCONTINUED | OUTPATIENT
Start: 2023-09-18 | End: 2023-09-18 | Stop reason: HOSPADM

## 2023-09-18 RX ORDER — SODIUM CHLORIDE 0.9 % (FLUSH) 0.9 %
10 SYRINGE (ML) INJECTION EVERY 12 HOURS SCHEDULED
Status: DISCONTINUED | OUTPATIENT
Start: 2023-09-18 | End: 2023-09-18 | Stop reason: HOSPADM

## 2023-09-18 NOTE — PROGRESS NOTES
Chief Complaint   Patient presents with    Routine Prenatal Visit     No Complaints/concerns        HPI: Cammy is a  currently at 36w2d here for prenatal visit who reports the following:  Baby is active. She denies any ctxs. She has been having daily headaches but no blurred vision. She has had increased swelling in her feet and no epigastric pain.                EXAM:     Vitals:    23 1326   BP: 154/80      Abdomen:   See prenatal flowsheet as noted and reviewed, soft, nontender   Pelvic:  See prenatal flowsheet as noted and reviewed   Urine:  See prenatal flowsheet as noted and reviewed    Lab Results   Component Value Date    ABO A 2023    RH Positive 2023    ABSCRN Normal 2023       MDM:  Impression: Supervision of low risk pregnancy  Elevated BP  Anemia in pregnancy   Tests done today: GBS testing  U/S for EFW - 6#8oz, 59%, AC 70%  US Ob Follow Up Transabdominal Approach (2023 13:25)    Topics discussed: kick counts and fetal movement  labor signs and symptoms  PIH precautions  Reviewed OB labs   Tests next visit: none                RTO:                        1 weeks, to  for NST, serial BPs, and labs      This note was electronically signed.  Manda Dolan, APRN  2023

## 2023-09-18 NOTE — NON STRESS TEST
Triage Note - Nursing Documentation  Labor and Delivery Admission Log    Cammy Falcon  : 2000  MRN: [id number]  CSN: [id number]    Date in / Time in:  2023  Time in: 1402    Date out / Time out:    Time out: 1520    Nurse: Rox Davis RN    Patient Info: She is a 23 y.o. year old  at 36w2d with an PINKY of 10/14/2023, by Ultrasound who was seen on the [facility].    Chief Complaint:   Chief Complaint   Patient presents with    Non-stress Test     Elevated bp in office         Provider Instructions / Disposition: Labs with serial bp's. Follows up in office on Monday, NST scheduled for Friday. Discharged home.    There is no problem list on file for this patient.      NST Documentation (Only applicable > 32 weeks): Interpretation A  Nonstress Test Interpretation A: Reactive (23 1532 : Rox Davis, RN)

## 2023-09-22 ENCOUNTER — HOSPITAL ENCOUNTER (OUTPATIENT)
Dept: LABOR AND DELIVERY | Facility: HOSPITAL | Age: 23
Discharge: HOME | End: 2023-09-22
Payer: COMMERCIAL

## 2023-09-22 ENCOUNTER — HOSPITAL ENCOUNTER (OUTPATIENT)
Facility: HOSPITAL | Age: 23
Discharge: HOME | End: 2023-09-22
Attending: MIDWIFE | Admitting: MIDWIFE
Payer: COMMERCIAL

## 2023-09-22 VITALS
WEIGHT: 189 LBS | RESPIRATION RATE: 18 BRPM | HEIGHT: 68 IN | OXYGEN SATURATION: 98 % | BODY MASS INDEX: 28.64 KG/M2 | HEART RATE: 104 BPM | DIASTOLIC BLOOD PRESSURE: 76 MMHG | SYSTOLIC BLOOD PRESSURE: 139 MMHG

## 2023-09-22 LAB
B-HEM STREP SPEC QL CULT: NEGATIVE
BILIRUB BLD-MCNC: NEGATIVE MG/DL
CLARITY, POC: CLEAR
COLOR UR: YELLOW
GLUCOSE UR STRIP-MCNC: NEGATIVE MG/DL
KETONES UR QL: NEGATIVE
LEUKOCYTE EST, POC: NEGATIVE
NITRITE UR-MCNC: NEGATIVE MG/ML
PH UR: 6 [PH] (ref 5–8)
PROT UR STRIP-MCNC: NEGATIVE MG/DL
RBC # UR STRIP: NEGATIVE /UL
SP GR UR: 1.02 (ref 1–1.03)
UROBILINOGEN UR QL: NORMAL

## 2023-09-22 PROCEDURE — G0463 HOSPITAL OUTPT CLINIC VISIT: HCPCS

## 2023-09-22 PROCEDURE — 59025 FETAL NON-STRESS TEST: CPT | Performed by: MIDWIFE

## 2023-09-22 PROCEDURE — 81002 URINALYSIS NONAUTO W/O SCOPE: CPT | Performed by: MIDWIFE

## 2023-09-22 PROCEDURE — 59025 FETAL NON-STRESS TEST: CPT

## 2023-09-22 RX ORDER — SODIUM CHLORIDE 9 MG/ML
40 INJECTION, SOLUTION INTRAVENOUS AS NEEDED
Status: DISCONTINUED | OUTPATIENT
Start: 2023-09-22 | End: 2023-09-22 | Stop reason: HOSPADM

## 2023-09-22 RX ORDER — LIDOCAINE HYDROCHLORIDE 10 MG/ML
0.5 INJECTION, SOLUTION INFILTRATION; PERINEURAL ONCE AS NEEDED
Status: DISCONTINUED | OUTPATIENT
Start: 2023-09-22 | End: 2023-09-22 | Stop reason: HOSPADM

## 2023-09-22 RX ORDER — SODIUM CHLORIDE 0.9 % (FLUSH) 0.9 %
10 SYRINGE (ML) INJECTION AS NEEDED
Status: DISCONTINUED | OUTPATIENT
Start: 2023-09-22 | End: 2023-09-22 | Stop reason: HOSPADM

## 2023-09-22 RX ORDER — SODIUM CHLORIDE 0.9 % (FLUSH) 0.9 %
10 SYRINGE (ML) INJECTION EVERY 12 HOURS SCHEDULED
Status: DISCONTINUED | OUTPATIENT
Start: 2023-09-22 | End: 2023-09-22 | Stop reason: HOSPADM

## 2023-09-22 NOTE — NON STRESS TEST
Triage Note - Nursing Documentation  Labor and Delivery Admission Log    Cammy Falcon  : 2000  MRN: [id number]  CSN: [id number]    Date in / Time in:  2023  Time in: 1158    Date out / Time out:    Time out: 1307    Nurse: Eileen Dotson RN    Patient Info: She is a 23 y.o. year old  at 36w6d with an PINKY of 10/14/2023, by Ultrasound who was seen on the [facility].    Chief Complaint:   Chief Complaint   Patient presents with    Non-stress Test     Gestational Hypertension       Provider Instructions / Disposition: Patient discharged home, instructed on fetal kick counts, s/s of labor, carlos watkins, hypertension with pregnancy and reasons to return to labor mcnally. Patient verbally understood and is to follow up with OB office on Monday.     There is no problem list on file for this patient.      NST Documentation (Only applicable > 32 weeks): Interpretation A  Nonstress Test Interpretation A: Reactive (23 1230 : Eileen Dotson, RN)

## 2023-09-25 ENCOUNTER — HOSPITAL ENCOUNTER (INPATIENT)
Facility: HOSPITAL | Age: 23
LOS: 3 days | Discharge: HOME | End: 2023-09-28
Attending: STUDENT IN AN ORGANIZED HEALTH CARE EDUCATION/TRAINING PROGRAM | Admitting: OBSTETRICS & GYNECOLOGY
Payer: COMMERCIAL

## 2023-09-25 ENCOUNTER — PREP FOR SURGERY (OUTPATIENT)
Dept: OTHER | Facility: HOSPITAL | Age: 23
End: 2023-09-25

## 2023-09-25 ENCOUNTER — HOSPITAL ENCOUNTER (OUTPATIENT)
Dept: LABOR AND DELIVERY | Facility: HOSPITAL | Age: 23
Discharge: HOME | End: 2023-09-25
Payer: COMMERCIAL

## 2023-09-25 ENCOUNTER — ROUTINE PRENATAL (OUTPATIENT)
Dept: OBSTETRICS AND GYNECOLOGY | Facility: CLINIC | Age: 23
End: 2023-09-25

## 2023-09-25 VITALS — WEIGHT: 190 LBS | SYSTOLIC BLOOD PRESSURE: 144 MMHG | DIASTOLIC BLOOD PRESSURE: 78 MMHG | BODY MASS INDEX: 28.89 KG/M2

## 2023-09-25 DIAGNOSIS — Z34.90 ENCOUNTER FOR INDUCTION OF LABOR: ICD-10-CM

## 2023-09-25 DIAGNOSIS — Z34.93 PRENATAL CARE IN THIRD TRIMESTER: Primary | ICD-10-CM

## 2023-09-25 DIAGNOSIS — O13.3 GESTATIONAL HTN, THIRD TRIMESTER: ICD-10-CM

## 2023-09-25 DIAGNOSIS — O13.3 GESTATIONAL HTN, THIRD TRIMESTER: Primary | ICD-10-CM

## 2023-09-25 LAB
ABO GROUP BLD: NORMAL
ABO GROUP BLD: NORMAL
ALBUMIN SERPL-MCNC: 3.8 G/DL (ref 3.5–5.2)
ALBUMIN/GLOB SERPL: 1.5 G/DL
ALP SERPL-CCNC: 123 U/L (ref 39–117)
ALT SERPL W P-5'-P-CCNC: 10 U/L (ref 1–33)
ANION GAP SERPL CALCULATED.3IONS-SCNC: 13.5 MMOL/L (ref 5–15)
AST SERPL-CCNC: 18 U/L (ref 1–32)
BASOPHILS # BLD AUTO: 0.04 10*3/MM3 (ref 0–0.2)
BASOPHILS NFR BLD AUTO: 0.3 % (ref 0–1.5)
BILIRUB SERPL-MCNC: 0.3 MG/DL (ref 0–1.2)
BLD GP AB SCN SERPL QL: NEGATIVE
BUN SERPL-MCNC: 11 MG/DL (ref 6–20)
BUN/CREAT SERPL: 12.9 (ref 7–25)
CALCIUM SPEC-SCNC: 9 MG/DL (ref 8.6–10.5)
CHLORIDE SERPL-SCNC: 103 MMOL/L (ref 98–107)
CO2 SERPL-SCNC: 19.5 MMOL/L (ref 22–29)
CREAT SERPL-MCNC: 0.85 MG/DL (ref 0.57–1)
CREAT UR-MCNC: 175 MG/DL
DEPRECATED RDW RBC AUTO: 45.1 FL (ref 37–54)
EGFRCR SERPLBLD CKD-EPI 2021: 98.9 ML/MIN/1.73
EOSINOPHIL # BLD AUTO: 0.12 10*3/MM3 (ref 0–0.4)
EOSINOPHIL NFR BLD AUTO: 0.9 % (ref 0.3–6.2)
ERYTHROCYTE [DISTWIDTH] IN BLOOD BY AUTOMATED COUNT: 13.9 % (ref 12.3–15.4)
GLOBULIN UR ELPH-MCNC: 2.6 GM/DL
GLUCOSE SERPL-MCNC: 97 MG/DL (ref 65–99)
HCT VFR BLD AUTO: 33.5 % (ref 34–46.6)
HGB BLD-MCNC: 11.3 G/DL (ref 12–15.9)
IMM GRANULOCYTES # BLD AUTO: 0.12 10*3/MM3 (ref 0–0.05)
IMM GRANULOCYTES NFR BLD AUTO: 0.9 % (ref 0–0.5)
LYMPHOCYTES # BLD AUTO: 2.51 10*3/MM3 (ref 0.7–3.1)
LYMPHOCYTES NFR BLD AUTO: 19.4 % (ref 19.6–45.3)
MCH RBC QN AUTO: 30.2 PG (ref 26.6–33)
MCHC RBC AUTO-ENTMCNC: 33.7 G/DL (ref 31.5–35.7)
MCV RBC AUTO: 89.6 FL (ref 79–97)
MONOCYTES # BLD AUTO: 1.2 10*3/MM3 (ref 0.1–0.9)
MONOCYTES NFR BLD AUTO: 9.3 % (ref 5–12)
NEUTROPHILS NFR BLD AUTO: 69.2 % (ref 42.7–76)
NEUTROPHILS NFR BLD AUTO: 8.98 10*3/MM3 (ref 1.7–7)
NRBC BLD AUTO-RTO: 0 /100 WBC (ref 0–0.2)
PLATELET # BLD AUTO: 295 10*3/MM3 (ref 140–450)
PMV BLD AUTO: 10.9 FL (ref 6–12)
POTASSIUM SERPL-SCNC: 4 MMOL/L (ref 3.5–5.2)
PROT ?TM UR-MCNC: 10 MG/DL
PROT SERPL-MCNC: 6.4 G/DL (ref 6–8.5)
PROT/CREAT UR: 57.1 MG/G CREA (ref 0–200)
RBC # BLD AUTO: 3.74 10*6/MM3 (ref 3.77–5.28)
RH BLD: POSITIVE
RH BLD: POSITIVE
SODIUM SERPL-SCNC: 136 MMOL/L (ref 136–145)
T&S EXPIRATION DATE: NORMAL
WBC NRBC COR # BLD: 12.97 10*3/MM3 (ref 3.4–10.8)

## 2023-09-25 PROCEDURE — 86900 BLOOD TYPING SEROLOGIC ABO: CPT

## 2023-09-25 PROCEDURE — 86901 BLOOD TYPING SEROLOGIC RH(D): CPT | Performed by: OBSTETRICS & GYNECOLOGY

## 2023-09-25 PROCEDURE — 59025 FETAL NON-STRESS TEST: CPT | Performed by: STUDENT IN AN ORGANIZED HEALTH CARE EDUCATION/TRAINING PROGRAM

## 2023-09-25 PROCEDURE — 85025 COMPLETE CBC W/AUTO DIFF WBC: CPT | Performed by: OBSTETRICS & GYNECOLOGY

## 2023-09-25 PROCEDURE — 86901 BLOOD TYPING SEROLOGIC RH(D): CPT

## 2023-09-25 PROCEDURE — 80053 COMPREHEN METABOLIC PANEL: CPT | Performed by: STUDENT IN AN ORGANIZED HEALTH CARE EDUCATION/TRAINING PROGRAM

## 2023-09-25 PROCEDURE — 86850 RBC ANTIBODY SCREEN: CPT | Performed by: OBSTETRICS & GYNECOLOGY

## 2023-09-25 PROCEDURE — 84156 ASSAY OF PROTEIN URINE: CPT | Performed by: STUDENT IN AN ORGANIZED HEALTH CARE EDUCATION/TRAINING PROGRAM

## 2023-09-25 PROCEDURE — 86900 BLOOD TYPING SEROLOGIC ABO: CPT | Performed by: OBSTETRICS & GYNECOLOGY

## 2023-09-25 PROCEDURE — 59025 FETAL NON-STRESS TEST: CPT

## 2023-09-25 PROCEDURE — 99222 1ST HOSP IP/OBS MODERATE 55: CPT | Performed by: STUDENT IN AN ORGANIZED HEALTH CARE EDUCATION/TRAINING PROGRAM

## 2023-09-25 PROCEDURE — 82570 ASSAY OF URINE CREATININE: CPT | Performed by: STUDENT IN AN ORGANIZED HEALTH CARE EDUCATION/TRAINING PROGRAM

## 2023-09-25 PROCEDURE — G0463 HOSPITAL OUTPT CLINIC VISIT: HCPCS

## 2023-09-25 PROCEDURE — 0502F SUBSEQUENT PRENATAL CARE: CPT | Performed by: OBSTETRICS & GYNECOLOGY

## 2023-09-25 RX ORDER — PROMETHAZINE HYDROCHLORIDE 12.5 MG/1
12.5 TABLET ORAL EVERY 6 HOURS PRN
Status: CANCELLED | OUTPATIENT
Start: 2023-09-25

## 2023-09-25 RX ORDER — OXYTOCIN/0.9 % SODIUM CHLORIDE 30/500 ML
1-20 PLASTIC BAG, INJECTION (ML) INTRAVENOUS
Status: CANCELLED | OUTPATIENT
Start: 2023-09-25

## 2023-09-25 RX ORDER — SODIUM CHLORIDE 0.9 % (FLUSH) 0.9 %
10 SYRINGE (ML) INJECTION EVERY 12 HOURS SCHEDULED
Status: CANCELLED | OUTPATIENT
Start: 2023-09-25

## 2023-09-25 RX ORDER — SODIUM CHLORIDE, SODIUM LACTATE, POTASSIUM CHLORIDE, CALCIUM CHLORIDE 600; 310; 30; 20 MG/100ML; MG/100ML; MG/100ML; MG/100ML
125 INJECTION, SOLUTION INTRAVENOUS CONTINUOUS
Status: DISCONTINUED | OUTPATIENT
Start: 2023-09-25 | End: 2023-09-26

## 2023-09-25 RX ORDER — HYDROXYZINE HYDROCHLORIDE 25 MG/1
50 TABLET, FILM COATED ORAL NIGHTLY PRN
Status: CANCELLED | OUTPATIENT
Start: 2023-09-25

## 2023-09-25 RX ORDER — ONDANSETRON 2 MG/ML
4 INJECTION INTRAMUSCULAR; INTRAVENOUS ONCE AS NEEDED
Status: CANCELLED | OUTPATIENT
Start: 2023-09-25

## 2023-09-25 RX ORDER — OXYTOCIN/0.9 % SODIUM CHLORIDE 30/500 ML
250 PLASTIC BAG, INJECTION (ML) INTRAVENOUS CONTINUOUS
Status: CANCELLED | OUTPATIENT
Start: 2023-09-25 | End: 2023-09-25

## 2023-09-25 RX ORDER — SODIUM CHLORIDE 0.9 % (FLUSH) 0.9 %
10 SYRINGE (ML) INJECTION AS NEEDED
Status: DISCONTINUED | OUTPATIENT
Start: 2023-09-25 | End: 2023-09-26

## 2023-09-25 RX ORDER — MORPHINE SULFATE 2 MG/ML
6 INJECTION, SOLUTION INTRAMUSCULAR; INTRAVENOUS EVERY 4 HOURS PRN
Status: DISCONTINUED | OUTPATIENT
Start: 2023-09-25 | End: 2023-09-26

## 2023-09-25 RX ORDER — ONDANSETRON 4 MG/1
4 TABLET, FILM COATED ORAL ONCE AS NEEDED
Status: CANCELLED | OUTPATIENT
Start: 2023-09-25

## 2023-09-25 RX ORDER — ONDANSETRON 4 MG/1
4 TABLET, FILM COATED ORAL EVERY 6 HOURS PRN
Status: CANCELLED | OUTPATIENT
Start: 2023-09-25

## 2023-09-25 RX ORDER — ACETAMINOPHEN 325 MG/1
650 TABLET ORAL EVERY 4 HOURS PRN
Status: DISCONTINUED | OUTPATIENT
Start: 2023-09-25 | End: 2023-09-26

## 2023-09-25 RX ORDER — SODIUM CHLORIDE 0.9 % (FLUSH) 0.9 %
10 SYRINGE (ML) INJECTION EVERY 12 HOURS SCHEDULED
Status: DISCONTINUED | OUTPATIENT
Start: 2023-09-25 | End: 2023-09-26

## 2023-09-25 RX ORDER — SODIUM CHLORIDE, SODIUM LACTATE, POTASSIUM CHLORIDE, CALCIUM CHLORIDE 600; 310; 30; 20 MG/100ML; MG/100ML; MG/100ML; MG/100ML
125 INJECTION, SOLUTION INTRAVENOUS CONTINUOUS
Status: CANCELLED | OUTPATIENT
Start: 2023-09-25

## 2023-09-25 RX ORDER — SODIUM CHLORIDE 9 MG/ML
40 INJECTION, SOLUTION INTRAVENOUS AS NEEDED
Status: DISCONTINUED | OUTPATIENT
Start: 2023-09-25 | End: 2023-09-26

## 2023-09-25 RX ORDER — ONDANSETRON 2 MG/ML
4 INJECTION INTRAMUSCULAR; INTRAVENOUS EVERY 6 HOURS PRN
Status: CANCELLED | OUTPATIENT
Start: 2023-09-25

## 2023-09-25 RX ORDER — PROMETHAZINE HYDROCHLORIDE 12.5 MG/1
12.5 SUPPOSITORY RECTAL EVERY 6 HOURS PRN
Status: CANCELLED | OUTPATIENT
Start: 2023-09-25

## 2023-09-25 RX ORDER — CARBOPROST TROMETHAMINE 250 UG/ML
250 INJECTION, SOLUTION INTRAMUSCULAR AS NEEDED
Status: CANCELLED | OUTPATIENT
Start: 2023-09-25

## 2023-09-25 RX ORDER — MISOPROSTOL 200 UG/1
800 TABLET ORAL AS NEEDED
Status: CANCELLED | OUTPATIENT
Start: 2023-09-25

## 2023-09-25 RX ORDER — MORPHINE SULFATE 2 MG/ML
2 INJECTION, SOLUTION INTRAMUSCULAR; INTRAVENOUS ONCE AS NEEDED
Status: CANCELLED | OUTPATIENT
Start: 2023-09-25

## 2023-09-25 RX ORDER — SODIUM CHLORIDE 0.9 % (FLUSH) 0.9 %
10 SYRINGE (ML) INJECTION AS NEEDED
Status: CANCELLED | OUTPATIENT
Start: 2023-09-25

## 2023-09-25 RX ORDER — ONDANSETRON 2 MG/ML
4 INJECTION INTRAMUSCULAR; INTRAVENOUS EVERY 6 HOURS PRN
Status: DISCONTINUED | OUTPATIENT
Start: 2023-09-25 | End: 2023-09-26

## 2023-09-25 RX ORDER — ACETAMINOPHEN 325 MG/1
650 TABLET ORAL EVERY 4 HOURS PRN
Status: CANCELLED | OUTPATIENT
Start: 2023-09-25

## 2023-09-25 RX ORDER — METHYLERGONOVINE MALEATE 0.2 MG/ML
200 INJECTION INTRAVENOUS ONCE AS NEEDED
Status: CANCELLED | OUTPATIENT
Start: 2023-09-25

## 2023-09-25 RX ORDER — ONDANSETRON 4 MG/1
4 TABLET, FILM COATED ORAL EVERY 6 HOURS PRN
Status: DISCONTINUED | OUTPATIENT
Start: 2023-09-25 | End: 2023-09-26

## 2023-09-25 RX ORDER — LIDOCAINE HYDROCHLORIDE 10 MG/ML
0.5 INJECTION, SOLUTION INFILTRATION; PERINEURAL ONCE AS NEEDED
Status: DISCONTINUED | OUTPATIENT
Start: 2023-09-25 | End: 2023-09-26

## 2023-09-25 RX ORDER — ACETAMINOPHEN 325 MG/1
650 TABLET ORAL ONCE AS NEEDED
Status: CANCELLED | OUTPATIENT
Start: 2023-09-25

## 2023-09-25 RX ORDER — PROMETHAZINE HYDROCHLORIDE 12.5 MG/1
12.5 TABLET ORAL EVERY 6 HOURS PRN
Status: DISCONTINUED | OUTPATIENT
Start: 2023-09-25 | End: 2023-09-26

## 2023-09-25 RX ORDER — LIDOCAINE HYDROCHLORIDE 10 MG/ML
0.5 INJECTION, SOLUTION INFILTRATION; PERINEURAL ONCE AS NEEDED
Status: CANCELLED | OUTPATIENT
Start: 2023-09-25

## 2023-09-25 RX ORDER — HYDROXYZINE HYDROCHLORIDE 25 MG/1
50 TABLET, FILM COATED ORAL NIGHTLY PRN
Status: DISCONTINUED | OUTPATIENT
Start: 2023-09-25 | End: 2023-09-26

## 2023-09-25 RX ORDER — PROMETHAZINE HYDROCHLORIDE 12.5 MG/1
12.5 SUPPOSITORY RECTAL EVERY 6 HOURS PRN
Status: DISCONTINUED | OUTPATIENT
Start: 2023-09-25 | End: 2023-09-26

## 2023-09-25 RX ORDER — OXYTOCIN/0.9 % SODIUM CHLORIDE 30/500 ML
1-20 PLASTIC BAG, INJECTION (ML) INTRAVENOUS
Status: DISCONTINUED | OUTPATIENT
Start: 2023-09-25 | End: 2023-09-26

## 2023-09-25 RX ORDER — SODIUM CHLORIDE 9 MG/ML
40 INJECTION, SOLUTION INTRAVENOUS AS NEEDED
Status: CANCELLED | OUTPATIENT
Start: 2023-09-25

## 2023-09-25 RX ORDER — OXYTOCIN/0.9 % SODIUM CHLORIDE 30/500 ML
999 PLASTIC BAG, INJECTION (ML) INTRAVENOUS ONCE
Status: CANCELLED | OUTPATIENT
Start: 2023-09-25

## 2023-09-25 RX ORDER — MORPHINE SULFATE 4 MG/ML
4 INJECTION, SOLUTION INTRAMUSCULAR; INTRAVENOUS EVERY 4 HOURS PRN
Status: DISCONTINUED | OUTPATIENT
Start: 2023-09-25 | End: 2023-09-26

## 2023-09-25 RX ADMIN — HYDROXYZINE HYDROCHLORIDE 50 MG: 25 TABLET, FILM COATED ORAL at 21:56

## 2023-09-25 RX ADMIN — Medication 1 MILLI-UNITS/MIN: at 20:22

## 2023-09-25 RX ADMIN — SODIUM CHLORIDE, POTASSIUM CHLORIDE, SODIUM LACTATE AND CALCIUM CHLORIDE 125 ML/HR: 600; 310; 30; 20 INJECTION, SOLUTION INTRAVENOUS at 16:27

## 2023-09-25 RX ADMIN — SODIUM CHLORIDE, POTASSIUM CHLORIDE, SODIUM LACTATE AND CALCIUM CHLORIDE 125 ML/HR: 600; 310; 30; 20 INJECTION, SOLUTION INTRAVENOUS at 21:36

## 2023-09-25 NOTE — PROGRESS NOTES
Prenatal Care Visit    Subjective   Chief Complaint   Patient presents with    Routine Prenatal Visit     Numbness in arms and legs, headaches.       History:   Cammy is a  currently at 37w2d who presents for a prenatal care visit today.    No major issues.    Social History    Tobacco Use      Smoking status: Never      Smokeless tobacco: Never       Objective   /78   Wt 86.2 kg (190 lb)   BMI 28.89 kg/m²   Physical Exam:  Normal, gestational age-appropriate exam today   SVE 0.5/25/-3, posterior       Plan   Medical Decision Making:    I have reviewed the prenatal labs and ultrasound(s) today. I have reviewed the most recent prenatal progress note(s).    Diagnosis: Supervision of high risk pregnancy  Gestational HTN   SAB x 2   Tests/Orders/Rx today: No orders of the defined types were placed in this encounter.      Medication Management: None     Topics discussed: Prenatal care milestones  induction of labor  kick counts and fetal movement  labor signs and symptoms  PIH precautions  BP/gHTN/PreE   Tests next visit: none   Next visit: none     Deep Birch MD  Obstetrics and Gynecology  [facility]

## 2023-09-25 NOTE — H&P
OBSTETRICS HISTORY AND PHYSICAL    CHIEF COMPLAINT: Scheduled IOL    DIAGNOSIS:  IUP at 37w2d  Gestational HTN  H/o HSV    ASSESSMENT/PLAN     23 y.o.  at 37w2d with a pregnancy complicated by:     # gHTN   - Mild range BP noted at 35, 36 and 37 wk visits with normal pre-e labs  - Denies s/sx of pre-e on admission, labs reassuring    # H/o HSV  - Does not recall having any true genital outbreaks, however has taken valtrex in the past and was advised to start for ppx ~2 weeks ago  - Denies s/sx of current outbreak, no lesions seen on exam    # Fetal Wellbeing   - Fetal tracing: Cat 1, reactive   - Ultrasound: reviewed   - Group B Streptococcus: negative   - Presentation: cephalic confirmed by exam    # Routine Obstetrics  - Labs reviewed  - MBT: A+    HISTORY OF PRESENT ILLNESS     23 y.o.  at 37w2d who presents for a scheduled IOL due to gHTN at term. She denies regular/ painful CTX, LOF. She has been having Fayette Anguiano and some spotting since being checked in clinic today. Reports (+) FM. Denies persistent HA, vision changes, RUQ pain.    OB Review of Systems:  Fetal movement: active  Vaginal bleeding: no  Loss of fluid: no  Contractions: no    Preeclampsia Symptoms Review:  Headaches: no  Vision changes:no  Chest pain and/or shortness of breath: no  RUQ pain: no    REVIEW OF SYSTEMS: A complete review of systems was performed and was specifically negative for headache, changes in vision, RUQ pain, shortness of breath, chest pain, lower extremity edema and dysuria.     HISTORY:  Obstetrical History:  OB History    Para Term  AB Living   3       2     SAB IAB Ectopic Molar Multiple Live Births   2                # Outcome Date GA Lbr Sharan/2nd Weight Sex Delivery Anes PTL Lv   3 Current            2 SAB            1 SAB              Past Medical History:  Past Medical History:   Diagnosis Date    Gestational HTN, third trimester 2023    HSV-2 infection      Past Surgical  History:  Past Surgical History:   Procedure Laterality Date    TONSILLECTOMY       Social History:  Social History     Tobacco Use    Smoking status: Never    Smokeless tobacco: Never   Vaping Use    Vaping Use: Never used   Substance Use Topics    Alcohol use: Never    Drug use: Never     Family History  History reviewed. No pertinent family history.     OBJECTIVE   VITALS:  Heart Rate:  [82-95] 90  BP: (108-144)/(78-92) 108/92     PHYSICAL EXAM:  GENERAL: NAD, alert  CHEST: No increased work of breathing   CV: normal rate, WWP  ABDOMEN: Gravid, nontender, EFW 3150 g, extrapolated from 35wk EFW scan (EFW 2958g, 59% at that time)  EXTREMITIES:  Warm and well-perfused, nontender, nonedematous  NEURO: AAO x 3, CN II-XII grossly intact  CERVIX: 1/60/-2, Navarro bulb placed with 40cc sterile saline, well tolerated    Fetal Monitoring:  Cat 1, reactive     Allergies:   Allergies   Allergen Reactions    Bactrim [Sulfamethoxazole-Trimethoprim] Hives       No current facility-administered medications on file prior to encounter.     Current Outpatient Medications on File Prior to Encounter   Medication Sig Dispense Refill    valACYclovir (VALTREX) 1000 MG tablet TAKE ONE TABLET BY MOUTH EVERY TWELVE HOURS FOR 5 DAYS OR TAKE TWO TABLETS BY MOUTH TWICE DAILY DURING AN OUTBREAK      doxylamine (UNISOM) 25 MG tablet Take  by mouth At Night As Needed for Sleep.      ferrous sulfate 325 (65 FE) MG tablet Take 1 tablet by mouth 2 (Two) Times a Day Before Meals. 60 tablet 6    Prenatal Vit-Fe Fumarate-FA (PRENATAL VITAMIN PO) Take 1 tablet by mouth Daily.       DIAGNOSTIC STUDIES:  Results from last 7 days   Lab Units 09/25/23  1630   WBC 10*3/mm3 12.97*   HEMOGLOBIN g/dL 11.3*   HEMATOCRIT % 33.5*   PLATELETS 10*3/mm3 295   SODIUM mmol/L 136   POTASSIUM mmol/L 4.0   CHLORIDE mmol/L 103   CO2 mmol/L 19.5*   BUN mg/dL 11   CREATININE mg/dL 0.85   GLUCOSE mg/dL 97   CALCIUM mg/dL 9.0   AST (SGOT) U/L 18   ALT (SGPT) U/L 10     Recent  Results (from the past 24 hour(s))   Protein / Creatinine Ratio, Urine - Urine, Clean Catch    Collection Time: 09/25/23  4:11 PM    Specimen: Urine, Clean Catch   Result Value Ref Range    Protein/Creatinine Ratio, Urine 57.1 0.0 - 200.0 mg/G Crea    Creatinine, Urine 175.0 mg/dL    Total Protein, Urine 10.0 mg/dL   Type & Screen    Collection Time: 09/25/23  4:30 PM    Specimen: Blood   Result Value Ref Range    ABO Type A     RH type Positive     Antibody Screen Negative     T&S Expiration Date 9/28/2023 11:59:59 PM    CBC Auto Differential    Collection Time: 09/25/23  4:30 PM    Specimen: Blood   Result Value Ref Range    WBC 12.97 (H) 3.40 - 10.80 10*3/mm3    RBC 3.74 (L) 3.77 - 5.28 10*6/mm3    Hemoglobin 11.3 (L) 12.0 - 15.9 g/dL    Hematocrit 33.5 (L) 34.0 - 46.6 %    MCV 89.6 79.0 - 97.0 fL    MCH 30.2 26.6 - 33.0 pg    MCHC 33.7 31.5 - 35.7 g/dL    RDW 13.9 12.3 - 15.4 %    RDW-SD 45.1 37.0 - 54.0 fl    MPV 10.9 6.0 - 12.0 fL    Platelets 295 140 - 450 10*3/mm3    Neutrophil % 69.2 42.7 - 76.0 %    Lymphocyte % 19.4 (L) 19.6 - 45.3 %    Monocyte % 9.3 5.0 - 12.0 %    Eosinophil % 0.9 0.3 - 6.2 %    Basophil % 0.3 0.0 - 1.5 %    Immature Grans % 0.9 (H) 0.0 - 0.5 %    Neutrophils, Absolute 8.98 (H) 1.70 - 7.00 10*3/mm3    Lymphocytes, Absolute 2.51 0.70 - 3.10 10*3/mm3    Monocytes, Absolute 1.20 (H) 0.10 - 0.90 10*3/mm3    Eosinophils, Absolute 0.12 0.00 - 0.40 10*3/mm3    Basophils, Absolute 0.04 0.00 - 0.20 10*3/mm3    Immature Grans, Absolute 0.12 (H) 0.00 - 0.05 10*3/mm3    nRBC 0.0 0.0 - 0.2 /100 WBC   Comprehensive Metabolic Panel    Collection Time: 09/25/23  4:30 PM    Specimen: Blood   Result Value Ref Range    Glucose 97 65 - 99 mg/dL    BUN 11 6 - 20 mg/dL    Creatinine 0.85 0.57 - 1.00 mg/dL    Sodium 136 136 - 145 mmol/L    Potassium 4.0 3.5 - 5.2 mmol/L    Chloride 103 98 - 107 mmol/L    CO2 19.5 (L) 22.0 - 29.0 mmol/L    Calcium 9.0 8.6 - 10.5 mg/dL    Total Protein 6.4 6.0 - 8.5 g/dL     Albumin 3.8 3.5 - 5.2 g/dL    ALT (SGPT) 10 1 - 33 U/L    AST (SGOT) 18 1 - 32 U/L    Alkaline Phosphatase 123 (H) 39 - 117 U/L    Total Bilirubin 0.3 0.0 - 1.2 mg/dL    Globulin 2.6 gm/dL    A/G Ratio 1.5 g/dL    BUN/Creatinine Ratio 12.9 7.0 - 25.0    Anion Gap 13.5 5.0 - 15.0 mmol/L    eGFR 98.9 >60.0 mL/min/1.73   ABO RH Specimen Verification    Collection Time: 09/25/23  4:31 PM    Specimen: Blood   Result Value Ref Range    ABO Type A     RH type Positive      Elaine Morris MD   Obstetrics and Gynecology  [facility]

## 2023-09-25 NOTE — NON STRESS TEST
Cammy Falcon, a  at 37w2d with an PINKY of 10/14/2023, by Ultrasound, was seen at [facility] for a nonstress test.    Chief Complaint   Patient presents with    Scheduled Induction     GHTN       Patient Active Problem List   Diagnosis    Gestational HTN, third trimester       Start Time: 1559  Stop Time: 1830    Interpretation A  Nonstress Test Interpretation A: Reactive      Reactive NST

## 2023-09-26 ENCOUNTER — ANESTHESIA EVENT (OUTPATIENT)
Dept: LABOR AND DELIVERY | Facility: HOSPITAL | Age: 23
End: 2023-09-26
Payer: COMMERCIAL

## 2023-09-26 ENCOUNTER — ANESTHESIA (OUTPATIENT)
Dept: LABOR AND DELIVERY | Facility: HOSPITAL | Age: 23
End: 2023-09-26
Payer: COMMERCIAL

## 2023-09-26 PROCEDURE — 10907ZC DRAINAGE OF AMNIOTIC FLUID, THERAPEUTIC FROM PRODUCTS OF CONCEPTION, VIA NATURAL OR ARTIFICIAL OPENING: ICD-10-PCS | Performed by: MIDWIFE

## 2023-09-26 PROCEDURE — 59400 OBSTETRICAL CARE: CPT | Performed by: MIDWIFE

## 2023-09-26 PROCEDURE — 25010000002 MORPHINE PER 10 MG: Performed by: OBSTETRICS & GYNECOLOGY

## 2023-09-26 PROCEDURE — 25010000002 TETANUS-DIPHTH-ACELL PERTUSSIS 5-2.5-18.5 LF-MCG/0.5 SUSPENSION PREFILLED SYRINGE: Performed by: STUDENT IN AN ORGANIZED HEALTH CARE EDUCATION/TRAINING PROGRAM

## 2023-09-26 PROCEDURE — 51703 INSERT BLADDER CATH COMPLEX: CPT

## 2023-09-26 PROCEDURE — 0UQGXZZ REPAIR VAGINA, EXTERNAL APPROACH: ICD-10-PCS | Performed by: MIDWIFE

## 2023-09-26 PROCEDURE — C1755 CATHETER, INTRASPINAL: HCPCS | Performed by: NURSE ANESTHETIST, CERTIFIED REGISTERED

## 2023-09-26 PROCEDURE — 90471 IMMUNIZATION ADMIN: CPT | Performed by: STUDENT IN AN ORGANIZED HEALTH CARE EDUCATION/TRAINING PROGRAM

## 2023-09-26 PROCEDURE — 90715 TDAP VACCINE 7 YRS/> IM: CPT | Performed by: STUDENT IN AN ORGANIZED HEALTH CARE EDUCATION/TRAINING PROGRAM

## 2023-09-26 PROCEDURE — 25010000002 ONDANSETRON PER 1 MG: Performed by: OBSTETRICS & GYNECOLOGY

## 2023-09-26 RX ORDER — ONDANSETRON 2 MG/ML
4 INJECTION INTRAMUSCULAR; INTRAVENOUS ONCE AS NEEDED
Status: DISCONTINUED | OUTPATIENT
Start: 2023-09-26 | End: 2023-09-26

## 2023-09-26 RX ORDER — MISOPROSTOL 200 UG/1
800 TABLET ORAL AS NEEDED
Status: DISCONTINUED | OUTPATIENT
Start: 2023-09-26 | End: 2023-09-26

## 2023-09-26 RX ORDER — DIPHENHYDRAMINE HCL 25 MG
25 CAPSULE ORAL NIGHTLY PRN
Status: DISCONTINUED | OUTPATIENT
Start: 2023-09-26 | End: 2023-09-28 | Stop reason: HOSPADM

## 2023-09-26 RX ORDER — ACETAMINOPHEN 325 MG/1
650 TABLET ORAL EVERY 6 HOURS PRN
Status: DISCONTINUED | OUTPATIENT
Start: 2023-09-26 | End: 2023-09-28 | Stop reason: HOSPADM

## 2023-09-26 RX ORDER — PROMETHAZINE HYDROCHLORIDE 12.5 MG/1
12.5 TABLET ORAL EVERY 6 HOURS PRN
Status: DISCONTINUED | OUTPATIENT
Start: 2023-09-26 | End: 2023-09-26

## 2023-09-26 RX ORDER — HYDROCODONE BITARTRATE AND ACETAMINOPHEN 10; 325 MG/1; MG/1
1 TABLET ORAL EVERY 4 HOURS PRN
Status: DISCONTINUED | OUTPATIENT
Start: 2023-09-26 | End: 2023-09-28 | Stop reason: HOSPADM

## 2023-09-26 RX ORDER — PROMETHAZINE HYDROCHLORIDE 12.5 MG/1
12.5 SUPPOSITORY RECTAL EVERY 6 HOURS PRN
Status: DISCONTINUED | OUTPATIENT
Start: 2023-09-26 | End: 2023-09-28 | Stop reason: HOSPADM

## 2023-09-26 RX ORDER — PROMETHAZINE HYDROCHLORIDE 25 MG/1
25 TABLET ORAL EVERY 6 HOURS PRN
Status: DISCONTINUED | OUTPATIENT
Start: 2023-09-26 | End: 2023-09-28 | Stop reason: HOSPADM

## 2023-09-26 RX ORDER — METHYLERGONOVINE MALEATE 0.2 MG/ML
200 INJECTION INTRAVENOUS ONCE AS NEEDED
Status: DISCONTINUED | OUTPATIENT
Start: 2023-09-26 | End: 2023-09-26

## 2023-09-26 RX ORDER — OXYTOCIN/0.9 % SODIUM CHLORIDE 30/500 ML
999 PLASTIC BAG, INJECTION (ML) INTRAVENOUS ONCE
Status: COMPLETED | OUTPATIENT
Start: 2023-09-26 | End: 2023-09-26

## 2023-09-26 RX ORDER — IBUPROFEN 600 MG/1
600 TABLET ORAL EVERY 6 HOURS PRN
Status: DISCONTINUED | OUTPATIENT
Start: 2023-09-26 | End: 2023-09-28 | Stop reason: HOSPADM

## 2023-09-26 RX ORDER — SODIUM CHLORIDE 0.9 % (FLUSH) 0.9 %
1-10 SYRINGE (ML) INJECTION AS NEEDED
Status: DISCONTINUED | OUTPATIENT
Start: 2023-09-26 | End: 2023-09-28 | Stop reason: HOSPADM

## 2023-09-26 RX ORDER — MORPHINE SULFATE 4 MG/ML
4 INJECTION, SOLUTION INTRAMUSCULAR; INTRAVENOUS ONCE AS NEEDED
Status: DISCONTINUED | OUTPATIENT
Start: 2023-09-26 | End: 2023-09-26

## 2023-09-26 RX ORDER — OXYTOCIN/0.9 % SODIUM CHLORIDE 30/500 ML
125 PLASTIC BAG, INJECTION (ML) INTRAVENOUS CONTINUOUS PRN
Status: DISCONTINUED | OUTPATIENT
Start: 2023-09-26 | End: 2023-09-28 | Stop reason: HOSPADM

## 2023-09-26 RX ORDER — PRENATAL VIT/IRON FUM/FOLIC AC 27MG-0.8MG
1 TABLET ORAL DAILY
Status: DISCONTINUED | OUTPATIENT
Start: 2023-09-26 | End: 2023-09-28 | Stop reason: HOSPADM

## 2023-09-26 RX ORDER — EPHEDRINE SULFATE 5 MG/ML
5 INJECTION INTRAVENOUS
Status: DISCONTINUED | OUTPATIENT
Start: 2023-09-26 | End: 2023-09-26

## 2023-09-26 RX ORDER — ONDANSETRON 4 MG/1
4 TABLET, FILM COATED ORAL ONCE AS NEEDED
Status: DISCONTINUED | OUTPATIENT
Start: 2023-09-26 | End: 2023-09-26

## 2023-09-26 RX ORDER — CARBOPROST TROMETHAMINE 250 UG/ML
250 INJECTION, SOLUTION INTRAMUSCULAR AS NEEDED
Status: DISCONTINUED | OUTPATIENT
Start: 2023-09-26 | End: 2023-09-26

## 2023-09-26 RX ORDER — ONDANSETRON 4 MG/1
4 TABLET, FILM COATED ORAL EVERY 8 HOURS PRN
Status: DISCONTINUED | OUTPATIENT
Start: 2023-09-26 | End: 2023-09-28 | Stop reason: HOSPADM

## 2023-09-26 RX ORDER — MAGNESIUM CARB/ALUMINUM HYDROX 105-160MG
30 TABLET,CHEWABLE ORAL ONCE
Status: COMPLETED | OUTPATIENT
Start: 2023-09-26 | End: 2023-09-26

## 2023-09-26 RX ORDER — OXYTOCIN/0.9 % SODIUM CHLORIDE 30/500 ML
250 PLASTIC BAG, INJECTION (ML) INTRAVENOUS CONTINUOUS
Status: ACTIVE | OUTPATIENT
Start: 2023-09-26 | End: 2023-09-26

## 2023-09-26 RX ORDER — HYDROCORTISONE 25 MG/G
CREAM TOPICAL AS NEEDED
Status: DISCONTINUED | OUTPATIENT
Start: 2023-09-26 | End: 2023-09-28 | Stop reason: HOSPADM

## 2023-09-26 RX ORDER — BISACODYL 10 MG
10 SUPPOSITORY, RECTAL RECTAL DAILY PRN
Status: DISCONTINUED | OUTPATIENT
Start: 2023-09-27 | End: 2023-09-28 | Stop reason: HOSPADM

## 2023-09-26 RX ORDER — ONDANSETRON 2 MG/ML
4 INJECTION INTRAMUSCULAR; INTRAVENOUS EVERY 6 HOURS PRN
Status: DISCONTINUED | OUTPATIENT
Start: 2023-09-26 | End: 2023-09-28 | Stop reason: HOSPADM

## 2023-09-26 RX ORDER — MORPHINE SULFATE 2 MG/ML
2 INJECTION, SOLUTION INTRAMUSCULAR; INTRAVENOUS ONCE AS NEEDED
Status: DISCONTINUED | OUTPATIENT
Start: 2023-09-26 | End: 2023-09-26

## 2023-09-26 RX ORDER — DOCUSATE SODIUM 100 MG/1
100 CAPSULE, LIQUID FILLED ORAL 2 TIMES DAILY PRN
Status: DISCONTINUED | OUTPATIENT
Start: 2023-09-26 | End: 2023-09-28 | Stop reason: HOSPADM

## 2023-09-26 RX ORDER — ACETAMINOPHEN 325 MG/1
650 TABLET ORAL ONCE AS NEEDED
Status: DISCONTINUED | OUTPATIENT
Start: 2023-09-26 | End: 2023-09-26

## 2023-09-26 RX ORDER — CITRIC ACID/SODIUM CITRATE 334-500MG
30 SOLUTION, ORAL ORAL ONCE
Status: DISCONTINUED | OUTPATIENT
Start: 2023-09-26 | End: 2023-09-26

## 2023-09-26 RX ORDER — HYDROCODONE BITARTRATE AND ACETAMINOPHEN 5; 325 MG/1; MG/1
1 TABLET ORAL EVERY 4 HOURS PRN
Status: DISCONTINUED | OUTPATIENT
Start: 2023-09-26 | End: 2023-09-28 | Stop reason: HOSPADM

## 2023-09-26 RX ADMIN — ACETAMINOPHEN 650 MG: 325 TABLET, FILM COATED ORAL at 14:11

## 2023-09-26 RX ADMIN — Medication: at 14:11

## 2023-09-26 RX ADMIN — HYDROCODONE BITARTRATE AND ACETAMINOPHEN 1 TABLET: 5; 325 TABLET ORAL at 18:32

## 2023-09-26 RX ADMIN — Medication 12 ML/HR: at 08:48

## 2023-09-26 RX ADMIN — MORPHINE SULFATE 4 MG: 4 INJECTION, SOLUTION INTRAMUSCULAR; INTRAVENOUS at 02:00

## 2023-09-26 RX ADMIN — MINERAL OIL 30 ML: 1000 SOLUTION ORAL at 10:25

## 2023-09-26 RX ADMIN — HYDROCODONE BITARTRATE AND ACETAMINOPHEN 1 TABLET: 5; 325 TABLET ORAL at 22:57

## 2023-09-26 RX ADMIN — ONDANSETRON 4 MG: 2 INJECTION INTRAMUSCULAR; INTRAVENOUS at 02:00

## 2023-09-26 RX ADMIN — SODIUM CHLORIDE, POTASSIUM CHLORIDE, SODIUM LACTATE AND CALCIUM CHLORIDE 125 ML/HR: 600; 310; 30; 20 INJECTION, SOLUTION INTRAVENOUS at 05:39

## 2023-09-26 RX ADMIN — Medication 999 ML/HR: at 10:36

## 2023-09-26 RX ADMIN — Medication 250 ML/HR: at 10:52

## 2023-09-26 RX ADMIN — TETANUS TOXOID, REDUCED DIPHTHERIA TOXOID AND ACELLULAR PERTUSSIS VACCINE, ADSORBED 0.5 ML: 5; 2.5; 8; 8; 2.5 SUSPENSION INTRAMUSCULAR at 18:32

## 2023-09-26 NOTE — ANESTHESIA PREPROCEDURE EVALUATION
Anesthesia Evaluation     Patient summary reviewed and Nursing notes reviewed   NPO Solid Status: > 8 hours  NPO Liquid Status: > 2 hours           Airway   Mallampati: II  TM distance: >3 FB  Neck ROM: full  Possible difficult intubation  Dental - normal exam     Pulmonary - negative pulmonary ROS and normal exam   (-) not a smoker  Cardiovascular - normal exam  Exercise tolerance: good (4-7 METS)    (+) hypertension (gestational)      Neuro/Psych- negative ROS  GI/Hepatic/Renal/Endo - negative ROS     Musculoskeletal (-) negative ROS    Abdominal    Substance History - negative use     OB/GYN    (+) Pregnant        Other        ROS/Med Hx Other: Plts 295                  Anesthesia Plan    ASA 2     epidural     (Risk and benefits discussed, discussed risk of nausea, vomiting, itching, low blood pressure, post-procedural back pain, PDPH, and infection. Patient reports understanding. Continue with POC)    Anesthetic plan, risks, benefits, and alternatives have been provided, discussed and informed consent has been obtained with: patient.  Pre-procedure education provided    CODE STATUS:    Code Status (Patient has no pulse and is not breathing): CPR (Attempt to Resuscitate)  Medical Interventions (Patient has pulse or is breathing): Full Support

## 2023-09-26 NOTE — NURSING NOTE
Pt to labor and delivery stating she slipped on hardwood stairs at home and landed on her buttocks.  Denies injury to head, neck, extremities.  Denies bleeding, ROM.  Reports active fetal movement.

## 2023-09-26 NOTE — PLAN OF CARE
Goal Outcome Evaluation:           Progress: improving  Outcome Evaluation: VSS, pain well controlled, breastfeeding progressing and bonding well. ambulated without difficulty

## 2023-09-26 NOTE — PAYOR COMM NOTE
"TO:[facility]  FROM:ADOLFO BLACKMON RN PHONE [phone]  IN CLINICALS REF# [id number]   EDC 10/14/23@37 3/7 WEEKS DELIVERED 23@1030  FEMALE 6#10 OZ APG /    Cammy Dobbins (23 y.o. Female)       Date of Birth   2000    Social Security Number       Address   [address] [place] [address]    Home Phone   [phone]    MRN   [id number]       [place]   Muslim    Marital Status                               Admission Date   23    Admission Type   Elective    Admitting Provider   Deep Birch MD    Attending Provider   Elaine Morris MD    Department, Room/Bed   [facility], /       Discharge Date       Discharge Disposition       Discharge Destination                                 Attending Provider: Elaine Morris MD    Allergies: Bactrim [Sulfamethoxazole-trimethoprim]    Isolation: None   Infection: None   Code Status: CPR    Ht: 172.7 cm (68\")   Wt: 86.2 kg (190 lb)    Admission Cmt: None   Principal Problem: Gestational HTN, third trimester [O13.3]                   Active Insurance as of 2023       Primary Coverage       Payor Plan Insurance Group Employer/Plan Group    [facility] [id number]       Payor Plan Address Payor Plan Phone Number Payor Plan Fax Number Effective Dates    [phone]   2023 - None Entered    [place] [address]         Subscriber Name Subscriber Birth Date Member ID       CONTRERAS MESA 1969 [id number]                     Emergency Contacts        (Rel.) Home Phone Work Phone Mobile Phone    DOBBINSTRACE MARAVILLA (Spouse) -- -- [phone]                 History & Physical        Elaine Morris MD at 23 1614          OBSTETRICS HISTORY AND PHYSICAL    CHIEF COMPLAINT: Scheduled IOL    DIAGNOSIS:  IUP at 37w2d  Gestational HTN  H/o HSV    ASSESSMENT/PLAN     23 y.o.  at 37w2d with a pregnancy complicated by:     # gHTN   - Mild " range BP noted at 35, 36 and 37 wk visits with normal pre-e labs  - Denies s/sx of pre-e on admission, labs reassuring    # H/o HSV  - Does not recall having any true genital outbreaks, however has taken valtrex in the past and was advised to start for ppx ~2 weeks ago  - Denies s/sx of current outbreak, no lesions seen on exam    # Fetal Wellbeing   - Fetal tracing: Cat 1, reactive   - Ultrasound: reviewed   - Group B Streptococcus: negative   - Presentation: cephalic confirmed by exam    # Routine Obstetrics  - Labs reviewed  - MBT: A+    HISTORY OF PRESENT ILLNESS     23 y.o.  at 37w2d who presents for a scheduled IOL due to gHTN at term. She denies regular/ painful CTX, LOF. She has been having Rozel Anguiano and some spotting since being checked in clinic today. Reports (+) FM. Denies persistent HA, vision changes, RUQ pain.    OB Review of Systems:  Fetal movement: active  Vaginal bleeding: no  Loss of fluid: no  Contractions: no    Preeclampsia Symptoms Review:  Headaches: no  Vision changes:no  Chest pain and/or shortness of breath: no  RUQ pain: no    REVIEW OF SYSTEMS: A complete review of systems was performed and was specifically negative for headache, changes in vision, RUQ pain, shortness of breath, chest pain, lower extremity edema and dysuria.     HISTORY:  Obstetrical History:  OB History    Para Term  AB Living   3       2     SAB IAB Ectopic Molar Multiple Live Births   2                # Outcome Date GA Lbr Sharan/2nd Weight Sex Delivery Anes PTL Lv   3 Current            2 SAB            1 SAB              Past Medical History:  Past Medical History:   Diagnosis Date    Gestational HTN, third trimester 2023    HSV-2 infection      Past Surgical History:  Past Surgical History:   Procedure Laterality Date    TONSILLECTOMY       Social History:  Social History     Tobacco Use    Smoking status: Never    Smokeless tobacco: Never   Vaping Use    Vaping Use: Never used    Substance Use Topics    Alcohol use: Never    Drug use: Never     Family History  History reviewed. No pertinent family history.     OBJECTIVE   VITALS:  Heart Rate:  [82-95] 90  BP: (108-144)/(78-92) 108/92     PHYSICAL EXAM:  GENERAL: NAD, alert  CHEST: No increased work of breathing   CV: normal rate, WWP  ABDOMEN: Gravid, nontender, EFW 3150 g, extrapolated from 35wk EFW scan (EFW 2958g, 59% at that time)  EXTREMITIES:  Warm and well-perfused, nontender, nonedematous  NEURO: AAO x 3, CN II-XII grossly intact  CERVIX: 1/60/-2, Navarro bulb placed with 40cc sterile saline, well tolerated    Fetal Monitoring:  Cat 1, reactive     Allergies:   Allergies   Allergen Reactions    Bactrim [Sulfamethoxazole-Trimethoprim] Hives       No current facility-administered medications on file prior to encounter.     Current Outpatient Medications on File Prior to Encounter   Medication Sig Dispense Refill    valACYclovir (VALTREX) 1000 MG tablet TAKE ONE TABLET BY MOUTH EVERY TWELVE HOURS FOR 5 DAYS OR TAKE TWO TABLETS BY MOUTH TWICE DAILY DURING AN OUTBREAK      doxylamine (UNISOM) 25 MG tablet Take  by mouth At Night As Needed for Sleep.      ferrous sulfate 325 (65 FE) MG tablet Take 1 tablet by mouth 2 (Two) Times a Day Before Meals. 60 tablet 6    Prenatal Vit-Fe Fumarate-FA (PRENATAL VITAMIN PO) Take 1 tablet by mouth Daily.       DIAGNOSTIC STUDIES:  Results from last 7 days   Lab Units 09/25/23  1630   WBC 10*3/mm3 12.97*   HEMOGLOBIN g/dL 11.3*   HEMATOCRIT % 33.5*   PLATELETS 10*3/mm3 295   SODIUM mmol/L 136   POTASSIUM mmol/L 4.0   CHLORIDE mmol/L 103   CO2 mmol/L 19.5*   BUN mg/dL 11   CREATININE mg/dL 0.85   GLUCOSE mg/dL 97   CALCIUM mg/dL 9.0   AST (SGOT) U/L 18   ALT (SGPT) U/L 10     Recent Results (from the past 24 hour(s))   Protein / Creatinine Ratio, Urine - Urine, Clean Catch    Collection Time: 09/25/23  4:11 PM    Specimen: Urine, Clean Catch   Result Value Ref Range    Protein/Creatinine Ratio, Urine  57.1 0.0 - 200.0 mg/G Crea    Creatinine, Urine 175.0 mg/dL    Total Protein, Urine 10.0 mg/dL   Type & Screen    Collection Time: 09/25/23  4:30 PM    Specimen: Blood   Result Value Ref Range    ABO Type A     RH type Positive     Antibody Screen Negative     T&S Expiration Date 9/28/2023 11:59:59 PM    CBC Auto Differential    Collection Time: 09/25/23  4:30 PM    Specimen: Blood   Result Value Ref Range    WBC 12.97 (H) 3.40 - 10.80 10*3/mm3    RBC 3.74 (L) 3.77 - 5.28 10*6/mm3    Hemoglobin 11.3 (L) 12.0 - 15.9 g/dL    Hematocrit 33.5 (L) 34.0 - 46.6 %    MCV 89.6 79.0 - 97.0 fL    MCH 30.2 26.6 - 33.0 pg    MCHC 33.7 31.5 - 35.7 g/dL    RDW 13.9 12.3 - 15.4 %    RDW-SD 45.1 37.0 - 54.0 fl    MPV 10.9 6.0 - 12.0 fL    Platelets 295 140 - 450 10*3/mm3    Neutrophil % 69.2 42.7 - 76.0 %    Lymphocyte % 19.4 (L) 19.6 - 45.3 %    Monocyte % 9.3 5.0 - 12.0 %    Eosinophil % 0.9 0.3 - 6.2 %    Basophil % 0.3 0.0 - 1.5 %    Immature Grans % 0.9 (H) 0.0 - 0.5 %    Neutrophils, Absolute 8.98 (H) 1.70 - 7.00 10*3/mm3    Lymphocytes, Absolute 2.51 0.70 - 3.10 10*3/mm3    Monocytes, Absolute 1.20 (H) 0.10 - 0.90 10*3/mm3    Eosinophils, Absolute 0.12 0.00 - 0.40 10*3/mm3    Basophils, Absolute 0.04 0.00 - 0.20 10*3/mm3    Immature Grans, Absolute 0.12 (H) 0.00 - 0.05 10*3/mm3    nRBC 0.0 0.0 - 0.2 /100 WBC   Comprehensive Metabolic Panel    Collection Time: 09/25/23  4:30 PM    Specimen: Blood   Result Value Ref Range    Glucose 97 65 - 99 mg/dL    BUN 11 6 - 20 mg/dL    Creatinine 0.85 0.57 - 1.00 mg/dL    Sodium 136 136 - 145 mmol/L    Potassium 4.0 3.5 - 5.2 mmol/L    Chloride 103 98 - 107 mmol/L    CO2 19.5 (L) 22.0 - 29.0 mmol/L    Calcium 9.0 8.6 - 10.5 mg/dL    Total Protein 6.4 6.0 - 8.5 g/dL    Albumin 3.8 3.5 - 5.2 g/dL    ALT (SGPT) 10 1 - 33 U/L    AST (SGOT) 18 1 - 32 U/L    Alkaline Phosphatase 123 (H) 39 - 117 U/L    Total Bilirubin 0.3 0.0 - 1.2 mg/dL    Globulin 2.6 gm/dL    A/G Ratio 1.5 g/dL     BUN/Creatinine Ratio 12.9 7.0 - 25.0    Anion Gap 13.5 5.0 - 15.0 mmol/L    eGFR 98.9 >60.0 mL/min/1.73   ABO RH Specimen Verification    Collection Time: 09/25/23  4:31 PM    Specimen: Blood   Result Value Ref Range    ABO Type A     RH type Positive      Elaine Morris MD   Obstetrics and Gynecology  [facility]       Electronically signed by Elaine Morris MD at 09/25/23 1822       Physician Progress Notes (last 24 hours)  Notes from 09/25/23 1312 through 09/26/23 1312   No notes of this type exist for this encounter.

## 2023-09-26 NOTE — PROGRESS NOTES
Scout Falcon  : 2000  MRN: [id number]  CSN: [id number]    Labor progress note    Subjective   She reports ctxs are getting uncomfortable. Navarro bulb just came out. She received Morphine IV last pm.     Objective   Min/max vitals past 24 hours:  Temp  Min: 98 °F (36.7 °C)  Max: 98 °F (36.7 °C)   BP  Min: 74/57  Max: 144/78   Pulse  Min: 62  Max: 103   Resp  Min: 16  Max: 16        FHT's: reassuring, reactive, and category 1   Cervix: was checked (by me): 4 cm / 80 % / -2, posterior, AROM clear fluid   Contractions: every 2-4 minutes - external monitors used Pitocin @ 16 mu      Assessment   IUP at 37w3d  Progressing in labor  Fetal status reassuring     Plan   Continue with induction  AROM - clear fluid seen   OK for epidural  Position changes/peanut ball    Manda Dolan, APRN  2023  08:08 EDT

## 2023-09-26 NOTE — L&D DELIVERY NOTE
[facility]   Vaginal Delivery Note    Patient Name: Cammy Falcon  : 2000  MRN: [id number]    Date of Delivery: 2023     Diagnosis     Pre & Post-Delivery:  Intrauterine pregnancy at 37w3d  Labor status: Induced Onset of Labor     Gestational HTN, third trimester     (spontaneous vaginal delivery)             Problem List    Transfer to Postpartum     Review the Delivery Report for details.     Delivery     Delivery: Vaginal, Spontaneous     YOB: 2023    Time of Birth:  Gestational Age 10:30 AM   37w3d     Anesthesia: Epidural     Delivering clinician: Manda PADILLA Foster    Forceps?   No   Vacuum? No    Shoulder dystocia present: No        Delivery narrative:  Cammy progressed to C+P and pushed effectively. Mouth and nose bulb suctioned on perineum. Nuchal cord reduced over head. Left anterior shoulder was delivered easily with gentle traction and maternal effort followed by posterior shoulder.  viable female. Infant stimulated, dried, and placed on mom's abdomen. Infant with lusty cry and spontaneous respirations. Delayed cord double clamped; cut by dad. Cord blood obtained. Placenta delivered Schultze intact with 3V cord. Pitocin 20 units IV given.  Left vaginal laceration repaired. FF @ U-1, light rubra lochia.       Infant     Findings: female  infant     Infant observations: Weight: 6# 10 oz  Length:   in  Observations/Comments:        Apgars: 8  @ 1 minute /    9  @ 5 minutes   Infant Name: Caro     Placenta & Cord         Placenta delivered  Spontaneous  at   2023 10:36 AM     Cord: 3 vessels  present.   Nuchal Cord?  yes; Number of nuchal loops present:  1    Cord blood obtained: Yes    Cord gases obtained:  No    Cord gas results: Venous:  No results found for: PHCVEN    Arterial:  No results found for: PHCART     Repair     Episiotomy: None     No    Lacerations: Yes  Laceration Information  Laceration Repaired?   Perineal: None      Periurethral:        Labial:       Sulcus:       Vaginal: Yes  Yes    Cervical:         Suture used for repair: 3-0 chromic gut  Laceration Length: 3 cm   Estimated Blood Loss: 150 ml     Quantitative Blood Loss:          Complications     none    Disposition     Mother to Mother Baby/Postpartum  in stable condition currently.  Baby to remains with mom  in stable condition currently.    Manda Dolan CNM  09/26/23  12:41 EDT

## 2023-09-26 NOTE — ANESTHESIA PROCEDURE NOTES
Labor Epidural      Patient reassessed immediately prior to procedure    Patient location during procedure: OB  Start Time: 9/26/2023 8:36 AM  Performed By  CRNA/CAA: Chucky Canela CRNA  Preanesthetic Checklist  Completed: patient identified, IV checked, site marked, risks and benefits discussed, surgical consent, monitors and equipment checked, pre-op evaluation and timeout performed  Additional Notes  Risks discussed , including but not limited to:  Headache, itching, n&v, infection, failure, decreased blood pressure, permanent chronic/back pain, nerve damage, paralysis, etc. All questions answered and informed consent obtained. Skin localized with lidocaine skin wheel. Test dose 2+3 ml of 1.5% lidocaine with 1:200,000 epi.  Prep:  Pt Position:sitting  Sterile Tech:cap, gloves, mask and sterile barrier  Prep:chlorhexidine gluconate and isopropyl alcohol  Monitoring:blood pressure monitoring and continuous pulse oximetry  Epidural Block Procedure:  Approach:midline  Guidance:landmark technique and palpation technique  Location:L3-L4  Needle Type:Tuohy  Needle Gauge:18 G  Loss of Resistance Medium: air  Loss of Resistance: 7cm  Cath Depth at skin:14 cm  Paresthesia: none  Aspiration:negative  Test Dose:negative  Number of Attempts: 1  Post Assessment:  Dressing:occlusive dressing applied and secured with tape  Pt Tolerance:patient tolerated the procedure well with no apparent complications  Complications:no

## 2023-09-27 LAB
BASOPHILS # BLD AUTO: 0.05 10*3/MM3 (ref 0–0.2)
BASOPHILS NFR BLD AUTO: 0.3 % (ref 0–1.5)
DEPRECATED RDW RBC AUTO: 47 FL (ref 37–54)
EOSINOPHIL # BLD AUTO: 0.17 10*3/MM3 (ref 0–0.4)
EOSINOPHIL NFR BLD AUTO: 1.2 % (ref 0.3–6.2)
ERYTHROCYTE [DISTWIDTH] IN BLOOD BY AUTOMATED COUNT: 14.1 % (ref 12.3–15.4)
HCT VFR BLD AUTO: 30 % (ref 34–46.6)
HGB BLD-MCNC: 9.9 G/DL (ref 12–15.9)
IMM GRANULOCYTES # BLD AUTO: 0.14 10*3/MM3 (ref 0–0.05)
IMM GRANULOCYTES NFR BLD AUTO: 1 % (ref 0–0.5)
LYMPHOCYTES # BLD AUTO: 2.59 10*3/MM3 (ref 0.7–3.1)
LYMPHOCYTES NFR BLD AUTO: 17.9 % (ref 19.6–45.3)
MCH RBC QN AUTO: 30 PG (ref 26.6–33)
MCHC RBC AUTO-ENTMCNC: 33 G/DL (ref 31.5–35.7)
MCV RBC AUTO: 90.9 FL (ref 79–97)
MONOCYTES # BLD AUTO: 1.17 10*3/MM3 (ref 0.1–0.9)
MONOCYTES NFR BLD AUTO: 8.1 % (ref 5–12)
NEUTROPHILS NFR BLD AUTO: 10.36 10*3/MM3 (ref 1.7–7)
NEUTROPHILS NFR BLD AUTO: 71.5 % (ref 42.7–76)
NRBC BLD AUTO-RTO: 0 /100 WBC (ref 0–0.2)
PLATELET # BLD AUTO: 231 10*3/MM3 (ref 140–450)
PMV BLD AUTO: 11.2 FL (ref 6–12)
RBC # BLD AUTO: 3.3 10*6/MM3 (ref 3.77–5.28)
WBC NRBC COR # BLD: 14.48 10*3/MM3 (ref 3.4–10.8)

## 2023-09-27 PROCEDURE — 85025 COMPLETE CBC W/AUTO DIFF WBC: CPT | Performed by: MIDWIFE

## 2023-09-27 RX ADMIN — IBUPROFEN 600 MG: 600 TABLET ORAL at 15:11

## 2023-09-27 RX ADMIN — PRENATAL VITAMINS-IRON FUMARATE 27 MG IRON-FOLIC ACID 0.8 MG TABLET 1 TABLET: at 08:47

## 2023-09-27 RX ADMIN — IBUPROFEN 600 MG: 600 TABLET ORAL at 23:36

## 2023-09-27 RX ADMIN — IBUPROFEN 600 MG: 600 TABLET ORAL at 08:47

## 2023-09-27 RX ADMIN — DOCUSATE SODIUM 100 MG: 100 CAPSULE, LIQUID FILLED ORAL at 08:50

## 2023-09-27 RX ADMIN — ACETAMINOPHEN 650 MG: 325 TABLET, FILM COATED ORAL at 04:46

## 2023-09-27 NOTE — PROGRESS NOTES
Patient: Cammy Falcon  * No surgery found *  Anesthesia type: Anesthesia type cannot be found on the log.    Patient location: [facility] Surgical Floor  Last vitals: /76   Pulse 77   Temp 97.9 °F (36.6 °C) (Oral)   Resp 18   SpO2 97%   Breastfeeding Yes   Level of consciousness: awake, alert, and oriented    Post-anesthesia pain: adequate analgesia  Airway patency: patent  Respiratory: unassisted  Cardiovascular: stable and blood pressure at baseline  Hydration: euvolemic    Anesthetic complications: no

## 2023-09-27 NOTE — PLAN OF CARE
Goal Outcome Evaluation:  Plan of Care Reviewed With: patient           Outcome Evaluation: VSS, pain well controlled with use of PRN PO medications, breastfeeding throughout night appropriately, positive maternal infant bond observed, Fundal and lochia check WNL

## 2023-09-27 NOTE — PROGRESS NOTES
[facility]  Vaginal Delivery Progress Note    Subjective   Subjective  Postpartum Day 1: Vaginal Delivery    The patient feels well.  Her pain is well controlled.  She is ambulating well.  Patient describes her bleeding as thin lochia. Voiding without difficulty    Breastfeeding: without difficulty.    Objective     Objective   Vital Signs Range for the last 24 hours  Temperature: Temp:  [97.9 °F (36.6 °C)-98.8 °F (37.1 °C)] 97.9 °F (36.6 °C)   Temp Source: Temp src: Oral   BP: BP: (107-140)/(60-94) 138/76   Pulse: Heart Rate:  [] 77   Respirations: Resp:  [16-18] 18   SPO2: SpO2:  [94 %-100 %] 97 %   O2 Amount (l/min):     O2 Devices Device (Oxygen Therapy): room air   Weight:       Admit Height:       Physical Exam:  General:  no acute distresss.  Abdomen: Fundus: appropriate, firm, non tender  Extremities: normal, atraumatic, no cyanosis, and trace edema.       Lab results reviewed:  Yes Hgb 9.9 Hct 30.0  Rubella:  No results found for: RUBELLAIGGIN Nurse Transcribed from prenatal record --    Rubella Antibodies, IgG   Date Value Ref Range Status   2023 67.8  Final     Rh Status:    RH type   Date Value Ref Range Status   2023 Positive  Final     Immunizations:   Immunization History   Administered Date(s) Administered    Tdap 2023       Assessment & Plan   Assessment & Plan    Gestational HTN, third trimester     (spontaneous vaginal delivery)      Cammy Falcon is Day 1  post-partum    Vaginal, Spontaneous    Anemia   .      Plan:  Continue current care.Iron supplementation      Ruma Herrmann PA-C  2023  08:24 EDT

## 2023-09-28 VITALS
OXYGEN SATURATION: 98 % | RESPIRATION RATE: 18 BRPM | DIASTOLIC BLOOD PRESSURE: 71 MMHG | TEMPERATURE: 98.3 F | SYSTOLIC BLOOD PRESSURE: 123 MMHG | HEART RATE: 73 BPM

## 2023-09-28 RX ORDER — IBUPROFEN 600 MG/1
600 TABLET ORAL EVERY 6 HOURS PRN
Qty: 60 TABLET | Refills: 0 | Status: SHIPPED | OUTPATIENT
Start: 2023-09-28

## 2023-09-28 RX ADMIN — Medication: at 09:29

## 2023-09-28 RX ADMIN — IBUPROFEN 600 MG: 600 TABLET ORAL at 09:29

## 2023-09-28 RX ADMIN — PRENATAL VITAMINS-IRON FUMARATE 27 MG IRON-FOLIC ACID 0.8 MG TABLET 1 TABLET: at 09:29

## 2023-09-28 RX ADMIN — DOCUSATE SODIUM 100 MG: 100 CAPSULE, LIQUID FILLED ORAL at 09:29

## 2023-09-28 NOTE — DISCHARGE SUMMARY
Discharge Summary     Butterfieldmary Falcon  : 2000  MRN: [id number]  CSN: [id number]    Date of Admission: 2023   Date of Discharge:  2023   Delivering Physician: Manda Dolan        Admission Diagnosis: Gestational HTN, third trimester [O13.3]   Discharge Diagnosis: Same as above plus  Pregnancy at 37w3d - delivered       Procedures: 2023  - Vaginal, Spontaneous       Hospital Course  Patient is a 23 y.o.  who at 37w3d had a vaginal birth.  Her postpartum course was without complications.  On PPD #2 she was ready for discharge.  She had normal lochia and pain well controlled with oral medications. She is breast feeding . She is undecided regarding contraception.    Infant  female  fetus weighing 3005 g (6 lb 10 oz)   Apgars -  8 @ 1 minute /  9 @ 5 minutes.    Discharge labs  Lab Results   Component Value Date    WBC 14.48 (H) 2023    HGB 9.9 (L) 2023    HCT 30.0 (L) 2023     2023       Discharge Medications     Discharge Medications        New Medications        Instructions Start Date   ibuprofen 600 MG tablet  Commonly known as: ADVIL,MOTRIN   600 mg, Oral, Every 6 Hours PRN             Continue These Medications        Instructions Start Date   ferrous sulfate 325 (65 FE) MG tablet   325 mg, Oral, 2 Times Daily Before Meals      PRENATAL VITAMIN PO   1 tablet, Oral, Daily      valACYclovir 1000 MG tablet  Commonly known as: VALTREX   TAKE ONE TABLET BY MOUTH EVERY TWELVE HOURS FOR 5 DAYS OR TAKE TWO TABLETS BY MOUTH TWICE DAILY DURING AN OUTBREAK             Stop These Medications      doxylamine 25 MG tablet  Commonly known as: UNISOM              Discharge Disposition Home or Self Care   Condition on Discharge: good   Follow-up: 6 weeks with [facility]     Time spent on discharge: 30 minutes or less  Manda Dolan, MABEL  2023

## 2023-09-28 NOTE — PLAN OF CARE
Goal Outcome Evaluation:  Plan of Care Reviewed With: patient        Progress: improving  Outcome Evaluation: VSS, I & O adequate, lochia wnl, breastfeeding progressing. Positive bonding observed. Ready for discharge.

## 2023-09-29 NOTE — PAYOR COMM NOTE
"To:  [facility]  From: Fallon Dotson RN  Phone: [phone]  Fax: [phone]  NPI: [id number]  TIN: [id number]  Member ID: [id number]   MRN: [id number]    Cammy Dobbins (23 y.o. Female)       Date of Birth   2000    Social Security Number       Address   [address] [place] [address]    Home Phone   [phone]    MRN   [id number]       Gnosticist   Muslim    Marital Status                               Admission Date   23    Admission Type   Elective    Admitting Provider   Deep Birch MD    Attending Provider       Department, Room/Bed   [facility], W2       Discharge Date   2023    Discharge Disposition   Home or Self Care    Discharge Destination   Home                              Attending Provider: (none)   Allergies: Bactrim [Sulfamethoxazole-trimethoprim]    Isolation: None   Infection: None   Code Status: Prior    Ht: 172.7 cm (68\")   Wt: 86.2 kg (190 lb)    Admission Cmt: None   Principal Problem: Gestational HTN, third trimester [O13.3]                   Active Insurance as of 2023       Primary Coverage       Payor Plan Insurance Group Employer/Plan Group    [facility] [id number]       Payor Plan Address Payor Plan Phone Number Payor Plan Fax Number Effective Dates    [address]   2023 - None Entered    [place] [address]         Subscriber Name Subscriber Birth Date Member ID       CONTRERAS MESA 1969 [id number]                     Emergency Contacts        (Rel.) Home Phone Work Phone Mobile Phone    TRACE DOBBINS (Spouse) -- -- [phone]                 Discharge Summary        Manda Dolan CNM at 23 0934       Attestation signed by Bonifacio Melendrez MD at 23 0972    I have reviewed this documentation and agree.                  Discharge Summary     Scout Dobbins  : 2000  MRN: [id number]  CSN: [id number]    Date of Admission: " 2023   Date of Discharge:  2023   Delivering Physician: Manda Dolan        Admission Diagnosis: Gestational HTN, third trimester [O13.3]   Discharge Diagnosis: Same as above plus  Pregnancy at 37w3d - delivered       Procedures: 2023  - Vaginal, Spontaneous       Hospital Course  Patient is a 23 y.o.  who at 37w3d had a vaginal birth.  Her postpartum course was without complications.  On PPD #2 she was ready for discharge.  She had normal lochia and pain well controlled with oral medications. She is breast feeding . She is undecided regarding contraception.    Infant  female  fetus weighing 3005 g (6 lb 10 oz)   Apgars -  8 @ 1 minute /  9 @ 5 minutes.    Discharge labs  Lab Results   Component Value Date    WBC 14.48 (H) 2023    HGB 9.9 (L) 2023    HCT 30.0 (L) 2023     2023       Discharge Medications     Discharge Medications        New Medications        Instructions Start Date   ibuprofen 600 MG tablet  Commonly known as: ADVIL,MOTRIN   600 mg, Oral, Every 6 Hours PRN             Continue These Medications        Instructions Start Date   ferrous sulfate 325 (65 FE) MG tablet   325 mg, Oral, 2 Times Daily Before Meals      PRENATAL VITAMIN PO   1 tablet, Oral, Daily      valACYclovir 1000 MG tablet  Commonly known as: VALTREX   TAKE ONE TABLET BY MOUTH EVERY TWELVE HOURS FOR 5 DAYS OR TAKE TWO TABLETS BY MOUTH TWICE DAILY DURING AN OUTBREAK             Stop These Medications      doxylamine 25 MG tablet  Commonly known as: UNISOM              Discharge Disposition Home or Self Care   Condition on Discharge: good   Follow-up: 6 weeks with [facility]     Time spent on discharge: 30 minutes or less  MABEL Bryant  2023      Electronically signed by Bonifacio Melendrez MD at 23 0936

## 2023-11-06 ENCOUNTER — POSTPARTUM VISIT (OUTPATIENT)
Dept: OBSTETRICS AND GYNECOLOGY | Facility: CLINIC | Age: 23
End: 2023-11-06
Payer: COMMERCIAL

## 2023-11-06 VITALS
DIASTOLIC BLOOD PRESSURE: 80 MMHG | BODY MASS INDEX: 24.4 KG/M2 | SYSTOLIC BLOOD PRESSURE: 126 MMHG | WEIGHT: 161 LBS | HEIGHT: 68 IN

## 2023-11-06 DIAGNOSIS — Z12.4 ENCOUNTER FOR SCREENING FOR CERVICAL CANCER: ICD-10-CM

## 2023-11-06 PROBLEM — O13.3 GESTATIONAL HTN, THIRD TRIMESTER: Status: RESOLVED | Noted: 2023-09-25 | Resolved: 2023-11-06

## 2023-11-06 PROCEDURE — 0503F POSTPARTUM CARE VISIT: CPT | Performed by: MIDWIFE

## 2023-11-06 NOTE — PROGRESS NOTES
"History  Chief Complaint   Patient presents with    Postpartum Care     PAP due.   No concerns at this time.         Cammy Falcon is a 23 y.o. year old  presenting to be seen for her postpartum visit.  She had a vaginal delivery.    Since delivery she has been sexually active. She has not used condoms.  She does not have concerns about post-partum blues/depression.   She is breast feeding .  For ongoing contraception, her plans are rhythm method.  She has not had a menstrual cycle.         Physical  /80   Ht 172.7 cm (68\")   Wt 73 kg (161 lb)   BMI 24.48 kg/m²     General:  well developed; well nourished  no acute distress   Abdomen: soft, non-tender; no masses   Pelvis: External genitalia:  normal appearance of the external genitalia including Bartholin's and [place]'s glands.  Vaginal:  normal pink mucosa without prolapse or lesions.  Cervix:  normal appearance.  Uterus:  normal size, shape and consistency.  Adnexa:  normal bimanual exam of the adnexa.        Assessment   Normal 6 week postpartum exam  Contraceptive management     Plan   BC options reviewed and compared today: rhythm method. Discussed possibility of no periods with breastfeeding  Pap smear done  Follow up 1 year    No orders of the defined types were placed in this encounter.         This note was electronically signed.  MABEL Escudero  2023  "

## 2023-11-07 LAB — REF LAB TEST METHOD: NORMAL

## 2025-07-14 ENCOUNTER — OFFICE VISIT (OUTPATIENT)
Dept: OBSTETRICS AND GYNECOLOGY | Facility: CLINIC | Age: 25
End: 2025-07-14
Payer: COMMERCIAL

## 2025-07-14 VITALS
SYSTOLIC BLOOD PRESSURE: 124 MMHG | BODY MASS INDEX: 21.67 KG/M2 | WEIGHT: 143 LBS | HEIGHT: 68 IN | DIASTOLIC BLOOD PRESSURE: 68 MMHG

## 2025-07-14 DIAGNOSIS — Z86.19 HISTORY OF POSITIVE PCR FOR HERPES SIMPLEX VIRUS TYPE 2 (HSV-2) DNA: ICD-10-CM

## 2025-07-14 DIAGNOSIS — Z01.419 WOMEN'S ANNUAL ROUTINE GYNECOLOGICAL EXAMINATION: Primary | ICD-10-CM

## 2025-07-14 PROCEDURE — 99395 PREV VISIT EST AGE 18-39: CPT | Performed by: PHYSICIAN ASSISTANT

## 2025-07-14 RX ORDER — VALACYCLOVIR HYDROCHLORIDE 1 G/1
1000 TABLET, FILM COATED ORAL 2 TIMES DAILY
Qty: 14 TABLET | Refills: 3 | Status: SHIPPED | OUTPATIENT
Start: 2025-07-14 | End: 2025-07-21

## 2025-07-14 NOTE — PROGRESS NOTES
"Subjective   Chief Complaint   Patient presents with    Gynecologic Exam     Annual no guillermo Chawla Ezekiel is a 25 y.o. year old  presenting to be seen for her annual gynecological exam.   No complaints or concerns  Not using contraception-natural family planning  Desires refills Valtrex which she take episodically and does not use that often    Past Medical History:   Diagnosis Date    Gestational HTN, third trimester 2023    HSV-2 infection         Current Outpatient Medications:     ferrous sulfate 325 (65 FE) MG tablet, Take 1 tablet by mouth 2 (Two) Times a Day Before Meals., Disp: 60 tablet, Rfl: 6    valACYclovir (VALTREX) 1000 MG tablet, Take 1 tablet by mouth 2 (Two) Times a Day for 7 days., Disp: 14 tablet, Rfl: 3   Allergies   Allergen Reactions    Bactrim [Sulfamethoxazole-Trimethoprim] Hives      Past Surgical History:   Procedure Laterality Date    TONSILLECTOMY        Social History     Socioeconomic History    Marital status:      Spouse name: Kota    Highest education level: Master's degree (e.g., MA, MS, Berenice, MEd, MSW, SARA)   Tobacco Use    Smoking status: Never    Smokeless tobacco: Never   Vaping Use    Vaping status: Never Used   Substance and Sexual Activity    Alcohol use: Never    Drug use: Never    Sexual activity: Yes     Partners: Male     Birth control/protection: Natural family planning/Rhythm      History reviewed. No pertinent family history.    Review of Systems   Constitutional: Negative.    Gastrointestinal: Negative.    Genitourinary: Negative.            Objective   /68   Ht 172.7 cm (68\")   Wt 64.9 kg (143 lb)   Breastfeeding No   BMI 21.74 kg/m²     Physical Exam  Exam conducted with a chaperone present.   Constitutional:       Appearance: Normal appearance. She is well-developed and well-groomed.   Eyes:      General: Lids are normal.      Extraocular Movements: Extraocular movements intact.      Conjunctiva/sclera: Conjunctivae normal. "   Chest:   Breasts:     Breasts are symmetrical.      Right: No inverted nipple, mass, nipple discharge, skin change or tenderness.      Left: No inverted nipple, mass, nipple discharge, skin change or tenderness.   Abdominal:      General: There is no distension.      Palpations: Abdomen is soft.      Tenderness: There is no abdominal tenderness.   Genitourinary:     Exam position: Lithotomy position.      Labia:         Right: No rash, tenderness or lesion.         Left: No rash, tenderness or lesion.       Urethra: No prolapse, urethral pain, urethral swelling or urethral lesion.      Vagina: No vaginal discharge, tenderness or lesions.      Cervix: No cervical motion tenderness, discharge, friability or lesion.      Uterus: Not enlarged and not tender.       Adnexa:         Right: No mass or tenderness.          Left: No mass or tenderness.     Musculoskeletal:      Cervical back: Neck supple.   Lymphadenopathy:      Upper Body:      Right upper body: No axillary adenopathy.      Left upper body: No axillary adenopathy.   Skin:     General: Skin is warm and dry.      Findings: No lesion.   Neurological:      General: No focal deficit present.      Mental Status: She is alert and oriented to person, place, and time.   Psychiatric:         Attention and Perception: Attention normal.         Mood and Affect: Mood normal.         Speech: Speech normal.         Behavior: Behavior normal.         Thought Content: Thought content normal.            Result Review :             LIQUID-BASED PAP SMEAR WITH HPV GENOTYPING IF ASCUS ([facility]) (11/06/2023 10:22)       Assessment and Plan  Diagnoses and all orders for this visit:    1. Women's annual routine gynecological examination (Primary)    2. 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  Dispense: 14 tablet; Refill:  3      Patient Instructions   Self breast exam monthly  Regular exercise             This note was electronically signed.    Ruma Herrmann PA-C   July 14, 2025